# Patient Record
Sex: FEMALE | Race: WHITE | NOT HISPANIC OR LATINO | Employment: OTHER | URBAN - METROPOLITAN AREA
[De-identification: names, ages, dates, MRNs, and addresses within clinical notes are randomized per-mention and may not be internally consistent; named-entity substitution may affect disease eponyms.]

---

## 2018-12-21 DIAGNOSIS — Z01.812 PRE-OPERATIVE LABORATORY EXAMINATION: ICD-10-CM

## 2018-12-21 LAB
ERYTHROCYTE [DISTWIDTH] IN BLOOD BY AUTOMATED COUNT: 42.2 FL (ref 35.9–50)
HCT VFR BLD AUTO: 44.5 % (ref 37–47)
HGB BLD-MCNC: 14.7 G/DL (ref 12–16)
MCH RBC QN AUTO: 31 PG (ref 27–33)
MCHC RBC AUTO-ENTMCNC: 33 G/DL (ref 33.6–35)
MCV RBC AUTO: 93.9 FL (ref 81.4–97.8)
PLATELET # BLD AUTO: 272 K/UL (ref 164–446)
PMV BLD AUTO: 10.4 FL (ref 9–12.9)
RBC # BLD AUTO: 4.74 M/UL (ref 4.2–5.4)
WBC # BLD AUTO: 7.1 K/UL (ref 4.8–10.8)

## 2018-12-21 PROCEDURE — 85027 COMPLETE CBC AUTOMATED: CPT

## 2018-12-21 PROCEDURE — 87640 STAPH A DNA AMP PROBE: CPT

## 2018-12-21 PROCEDURE — 80048 BASIC METABOLIC PNL TOTAL CA: CPT

## 2018-12-21 PROCEDURE — 36415 COLL VENOUS BLD VENIPUNCTURE: CPT

## 2018-12-21 PROCEDURE — 85610 PROTHROMBIN TIME: CPT

## 2018-12-21 PROCEDURE — 85730 THROMBOPLASTIN TIME PARTIAL: CPT

## 2018-12-21 PROCEDURE — 87641 MR-STAPH DNA AMP PROBE: CPT

## 2018-12-21 RX ORDER — IBUPROFEN 200 MG
200 TABLET ORAL EVERY 6 HOURS PRN
Status: ON HOLD | COMMUNITY
End: 2019-01-03

## 2018-12-21 RX ORDER — OXYBUTYNIN CHLORIDE 5 MG/1
5 TABLET, EXTENDED RELEASE ORAL DAILY
COMMUNITY
End: 2019-05-02

## 2018-12-21 RX ORDER — SIMVASTATIN 40 MG
40 TABLET ORAL NIGHTLY
COMMUNITY
End: 2019-08-22 | Stop reason: SDUPTHER

## 2018-12-21 RX ORDER — TRIAMTERENE AND HYDROCHLOROTHIAZIDE 37.5; 25 MG/1; MG/1
1 TABLET ORAL DAILY
COMMUNITY
End: 2019-08-13 | Stop reason: SDUPTHER

## 2018-12-21 RX ORDER — TRAMADOL HYDROCHLORIDE 50 MG/1
50 TABLET ORAL EVERY 6 HOURS PRN
Status: ON HOLD | COMMUNITY
End: 2019-01-03

## 2018-12-21 RX ORDER — BENAZEPRIL HYDROCHLORIDE 10 MG/1
10 TABLET ORAL DAILY
COMMUNITY
End: 2019-08-22 | Stop reason: SDUPTHER

## 2018-12-21 NOTE — OR NURSING
"Pre-admit appointment completed. \"Preparing for your procedure\" sheet given to pt along with verbal and written instructions. Pt instructed to continue regularly prescribed medications through the day before surgery. Pt instructed to take the following medications the day of surgery with a sip of water, per anesthesia protocol; tramadol if needed.     Dr Chapman noitfied via email of procedure due to BMI=40.54 and other co-morbidities.     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    Spoke with Eloisa at Dr Acosta. She has clearance from PCP and will fax now. Pt states CXR and EKG done at PCP office. Called PCP office and they will fax reports and labs that were done.   "

## 2018-12-21 NOTE — OR NURSING
"TOTAL JOINT REPLACEMENT SURGERY   PreAdmit Appointment  Pre-Operative Education Note       1) Did you take a Total Joint Replacement Pre-Operative Education class?  Where?  Answer: no    2) If you did not take a class, did you receive pre-op education in the form of a book or through an online class?    Answer: no    3) Have you had the same joint replacement procedure within the last 3 years?   Answer: no    For patients who answered \"No\" to the above questions:     4) Was patient given information on [facility] Pre-Op Education class through the Pre-Op Education Class flyer or the Alternative Pre-op Education flyer?   Answer: yes    5) Was a [facility] Total Joint Replacement Patient Guide binder handed out?   Answer: yes    6) Did the patient refuse preoperative education?  Answer: no     DISCHARGE PLANNING NOTE - TOTAL JOINT    Procedure: Procedure(s):  KNEE ARTHROPLASTY TOTAL  Procedure Date: 1/3/2019  Insurance:    Equipment currently available at home? Walker and cane  Steps into the home? 1  Steps within the home? 0  Toilet height? standard  Type of shower? Walk in.no bench  Who will be with you during your recovery? son  Is Outpatient Physical Therapy set up after surgery? Yes  Did you take the Total Joint Class and where? No     Plan:Pt given home safety checklist and equipment resource guide.  "

## 2018-12-22 LAB
ANION GAP SERPL CALC-SCNC: 13 MMOL/L (ref 0–11.9)
APTT PPP: 35.4 SEC (ref 24.7–36)
BUN SERPL-MCNC: 20 MG/DL (ref 8–22)
CALCIUM SERPL-MCNC: 10.7 MG/DL (ref 8.5–10.5)
CHLORIDE SERPL-SCNC: 103 MMOL/L (ref 96–112)
CO2 SERPL-SCNC: 24 MMOL/L (ref 20–33)
CREAT SERPL-MCNC: 0.83 MG/DL (ref 0.5–1.4)
GLUCOSE SERPL-MCNC: 143 MG/DL (ref 65–99)
INR PPP: 1.07 (ref 0.87–1.13)
POTASSIUM SERPL-SCNC: 4.3 MMOL/L (ref 3.6–5.5)
PROTHROMBIN TIME: 14.1 SEC (ref 12–14.6)
SCCMEC + MECA PNL NOSE NAA+PROBE: NEGATIVE
SCCMEC + MECA PNL NOSE NAA+PROBE: NEGATIVE
SODIUM SERPL-SCNC: 140 MMOL/L (ref 135–145)

## 2018-12-22 NOTE — OR NURSING
Dr Chapman reviewed patients medical history. Based upon information available, Dr Chapman indicates that:     Ok to proceed from my standpoint pending the clearance paperwork.  Thank you.

## 2019-01-03 ENCOUNTER — HOSPITAL ENCOUNTER (INPATIENT)
Facility: MEDICAL CENTER | Age: 74
LOS: 1 days | DRG: 470 | End: 2019-01-04
Attending: ORTHOPAEDIC SURGERY | Admitting: ORTHOPAEDIC SURGERY
Payer: MEDICARE

## 2019-01-03 DIAGNOSIS — M17.11 ARTHRITIS OF RIGHT KNEE: ICD-10-CM

## 2019-01-03 PROCEDURE — 700111 HCHG RX REV CODE 636 W/ 250 OVERRIDE (IP): Performed by: ANESTHESIOLOGY

## 2019-01-03 PROCEDURE — 700101 HCHG RX REV CODE 250

## 2019-01-03 PROCEDURE — 160041 HCHG SURGERY MINUTES - EA ADDL 1 MIN LEVEL 4: Performed by: ORTHOPAEDIC SURGERY

## 2019-01-03 PROCEDURE — L8699 PROSTHETIC IMPLANT NOS: HCPCS | Performed by: ORTHOPAEDIC SURGERY

## 2019-01-03 PROCEDURE — 700101 HCHG RX REV CODE 250: Performed by: ORTHOPAEDIC SURGERY

## 2019-01-03 PROCEDURE — 160048 HCHG OR STATISTICAL LEVEL 1-5: Performed by: ORTHOPAEDIC SURGERY

## 2019-01-03 PROCEDURE — A9270 NON-COVERED ITEM OR SERVICE: HCPCS | Performed by: ORTHOPAEDIC SURGERY

## 2019-01-03 PROCEDURE — 700102 HCHG RX REV CODE 250 W/ 637 OVERRIDE(OP): Performed by: ANESTHESIOLOGY

## 2019-01-03 PROCEDURE — 502000 HCHG MISC OR IMPLANTS RC 0278: Performed by: ORTHOPAEDIC SURGERY

## 2019-01-03 PROCEDURE — 94760 N-INVAS EAR/PLS OXIMETRY 1: CPT

## 2019-01-03 PROCEDURE — 700111 HCHG RX REV CODE 636 W/ 250 OVERRIDE (IP): Performed by: ORTHOPAEDIC SURGERY

## 2019-01-03 PROCEDURE — 700112 HCHG RX REV CODE 229: Performed by: ORTHOPAEDIC SURGERY

## 2019-01-03 PROCEDURE — 700111 HCHG RX REV CODE 636 W/ 250 OVERRIDE (IP)

## 2019-01-03 PROCEDURE — 160035 HCHG PACU - 1ST 60 MINS PHASE I: Performed by: ORTHOPAEDIC SURGERY

## 2019-01-03 PROCEDURE — 160009 HCHG ANES TIME/MIN: Performed by: ORTHOPAEDIC SURGERY

## 2019-01-03 PROCEDURE — 700102 HCHG RX REV CODE 250 W/ 637 OVERRIDE(OP): Performed by: ORTHOPAEDIC SURGERY

## 2019-01-03 PROCEDURE — 700105 HCHG RX REV CODE 258

## 2019-01-03 PROCEDURE — A9270 NON-COVERED ITEM OR SERVICE: HCPCS | Performed by: ANESTHESIOLOGY

## 2019-01-03 PROCEDURE — 160029 HCHG SURGERY MINUTES - 1ST 30 MINS LEVEL 4: Performed by: ORTHOPAEDIC SURGERY

## 2019-01-03 PROCEDURE — 160002 HCHG RECOVERY MINUTES (STAT): Performed by: ORTHOPAEDIC SURGERY

## 2019-01-03 PROCEDURE — 700105 HCHG RX REV CODE 258: Performed by: ORTHOPAEDIC SURGERY

## 2019-01-03 PROCEDURE — 501838 HCHG SUTURE GENERAL: Performed by: ORTHOPAEDIC SURGERY

## 2019-01-03 PROCEDURE — 770001 HCHG ROOM/CARE - MED/SURG/GYN PRIV*

## 2019-01-03 PROCEDURE — 160036 HCHG PACU - EA ADDL 30 MINS PHASE I: Performed by: ORTHOPAEDIC SURGERY

## 2019-01-03 PROCEDURE — A9270 NON-COVERED ITEM OR SERVICE: HCPCS

## 2019-01-03 PROCEDURE — 502579 HCHG PACK, TOTAL KNEE: Performed by: ORTHOPAEDIC SURGERY

## 2019-01-03 PROCEDURE — 500002 HCHG ADHESIVE, DERMABOND: Performed by: ORTHOPAEDIC SURGERY

## 2019-01-03 PROCEDURE — 700102 HCHG RX REV CODE 250 W/ 637 OVERRIDE(OP)

## 2019-01-03 PROCEDURE — 160022 HCHG BLOCK: Performed by: ORTHOPAEDIC SURGERY

## 2019-01-03 PROCEDURE — 0SRC0J9 REPLACEMENT OF RIGHT KNEE JOINT WITH SYNTHETIC SUBSTITUTE, CEMENTED, OPEN APPROACH: ICD-10-PCS | Performed by: ORTHOPAEDIC SURGERY

## 2019-01-03 DEVICE — IMPLANT GII OVAL RESURFACING PAT 32MM (1EA): Type: IMPLANTABLE DEVICE | Site: KNEE | Status: FUNCTIONAL

## 2019-01-03 DEVICE — CEMENT ORTHOPEDIC HV US  (10/PK): Type: IMPLANTABLE DEVICE | Site: KNEE | Status: FUNCTIONAL

## 2019-01-03 DEVICE — IMPLANT LEGION PS NP FEM SZ 5 RT: Type: IMPLANTABLE DEVICE | Site: KNEE | Status: FUNCTIONAL

## 2019-01-03 DEVICE — IMPLANTABLE DEVICE: Type: IMPLANTABLE DEVICE | Site: KNEE | Status: FUNCTIONAL

## 2019-01-03 DEVICE — IMPLANT GNS II CMT TIB SIZE 4 RIGHT: Type: IMPLANTABLE DEVICE | Site: KNEE | Status: FUNCTIONAL

## 2019-01-03 RX ORDER — MEPERIDINE HYDROCHLORIDE 25 MG/ML
12.5 INJECTION INTRAMUSCULAR; INTRAVENOUS; SUBCUTANEOUS
Status: DISCONTINUED | OUTPATIENT
Start: 2019-01-03 | End: 2019-01-03 | Stop reason: HOSPADM

## 2019-01-03 RX ORDER — KETOROLAC TROMETHAMINE 30 MG/ML
15 INJECTION, SOLUTION INTRAMUSCULAR; INTRAVENOUS EVERY 6 HOURS
Status: DISCONTINUED | OUTPATIENT
Start: 2019-01-03 | End: 2019-01-04 | Stop reason: HOSPADM

## 2019-01-03 RX ORDER — MELOXICAM 7.5 MG/1
7.5 TABLET ORAL DAILY
Qty: 30 TAB | Refills: 1 | Status: SHIPPED | OUTPATIENT
Start: 2019-01-03 | End: 2019-02-02

## 2019-01-03 RX ORDER — METOPROLOL TARTRATE 1 MG/ML
1 INJECTION, SOLUTION INTRAVENOUS
Status: DISCONTINUED | OUTPATIENT
Start: 2019-01-03 | End: 2019-01-03 | Stop reason: HOSPADM

## 2019-01-03 RX ORDER — TRIAMTERENE AND HYDROCHLOROTHIAZIDE 37.5; 25 MG/1; MG/1
1 TABLET ORAL DAILY
Status: DISCONTINUED | OUTPATIENT
Start: 2019-01-04 | End: 2019-01-04 | Stop reason: HOSPADM

## 2019-01-03 RX ORDER — TRAMADOL HYDROCHLORIDE 50 MG/1
50-100 TABLET ORAL EVERY 6 HOURS PRN
Qty: 80 TAB | Refills: 0 | Status: SHIPPED | OUTPATIENT
Start: 2019-01-03 | End: 2019-01-13

## 2019-01-03 RX ORDER — DEXAMETHASONE SODIUM PHOSPHATE 4 MG/ML
4 INJECTION, SOLUTION INTRA-ARTICULAR; INTRALESIONAL; INTRAMUSCULAR; INTRAVENOUS; SOFT TISSUE
Status: DISCONTINUED | OUTPATIENT
Start: 2019-01-03 | End: 2019-01-04 | Stop reason: HOSPADM

## 2019-01-03 RX ORDER — HYDROMORPHONE HYDROCHLORIDE 2 MG/ML
0.2 INJECTION, SOLUTION INTRAMUSCULAR; INTRAVENOUS; SUBCUTANEOUS
Status: DISCONTINUED | OUTPATIENT
Start: 2019-01-03 | End: 2019-01-03 | Stop reason: HOSPADM

## 2019-01-03 RX ORDER — TRANEXAMIC ACID 100 MG/ML
INJECTION, SOLUTION INTRAVENOUS
Status: COMPLETED
Start: 2019-01-03 | End: 2019-01-03

## 2019-01-03 RX ORDER — OXYCODONE HCL 5 MG/5 ML
10 SOLUTION, ORAL ORAL
Status: COMPLETED | OUTPATIENT
Start: 2019-01-03 | End: 2019-01-03

## 2019-01-03 RX ORDER — GABAPENTIN 300 MG/1
CAPSULE ORAL
Status: COMPLETED
Start: 2019-01-03 | End: 2019-01-03

## 2019-01-03 RX ORDER — HYDRALAZINE HYDROCHLORIDE 20 MG/ML
5 INJECTION INTRAMUSCULAR; INTRAVENOUS
Status: DISCONTINUED | OUTPATIENT
Start: 2019-01-03 | End: 2019-01-03 | Stop reason: HOSPADM

## 2019-01-03 RX ORDER — OXYCODONE HYDROCHLORIDE 10 MG/1
10 TABLET ORAL
Status: DISCONTINUED | OUTPATIENT
Start: 2019-01-03 | End: 2019-01-04 | Stop reason: HOSPADM

## 2019-01-03 RX ORDER — EPINEPHRINE 1 MG/ML(1)
AMPUL (ML) INJECTION
Status: DISCONTINUED | OUTPATIENT
Start: 2019-01-03 | End: 2019-01-03 | Stop reason: HOSPADM

## 2019-01-03 RX ORDER — LIDOCAINE HYDROCHLORIDE 10 MG/ML
INJECTION, SOLUTION INFILTRATION; PERINEURAL
Status: COMPLETED
Start: 2019-01-03 | End: 2019-01-03

## 2019-01-03 RX ORDER — ACETAMINOPHEN 500 MG
TABLET ORAL
Status: COMPLETED
Start: 2019-01-03 | End: 2019-01-03

## 2019-01-03 RX ORDER — DIPHENHYDRAMINE HYDROCHLORIDE 50 MG/ML
25 INJECTION INTRAMUSCULAR; INTRAVENOUS EVERY 6 HOURS PRN
Status: DISCONTINUED | OUTPATIENT
Start: 2019-01-03 | End: 2019-01-04 | Stop reason: HOSPADM

## 2019-01-03 RX ORDER — SODIUM CHLORIDE, SODIUM LACTATE, POTASSIUM CHLORIDE, CALCIUM CHLORIDE 600; 310; 30; 20 MG/100ML; MG/100ML; MG/100ML; MG/100ML
1000 INJECTION, SOLUTION INTRAVENOUS CONTINUOUS
Status: DISCONTINUED | OUTPATIENT
Start: 2019-01-03 | End: 2019-01-03 | Stop reason: HOSPADM

## 2019-01-03 RX ORDER — SIMVASTATIN 20 MG
40 TABLET ORAL NIGHTLY
Status: DISCONTINUED | OUTPATIENT
Start: 2019-01-03 | End: 2019-01-04 | Stop reason: HOSPADM

## 2019-01-03 RX ORDER — SODIUM CHLORIDE, SODIUM LACTATE, POTASSIUM CHLORIDE, CALCIUM CHLORIDE 600; 310; 30; 20 MG/100ML; MG/100ML; MG/100ML; MG/100ML
INJECTION, SOLUTION INTRAVENOUS ONCE
Status: COMPLETED | OUTPATIENT
Start: 2019-01-03 | End: 2019-01-03

## 2019-01-03 RX ORDER — OXYCODONE HCL 5 MG/5 ML
5 SOLUTION, ORAL ORAL
Status: COMPLETED | OUTPATIENT
Start: 2019-01-03 | End: 2019-01-03

## 2019-01-03 RX ORDER — POLYETHYLENE GLYCOL 3350 17 G/17G
1 POWDER, FOR SOLUTION ORAL 2 TIMES DAILY PRN
Status: DISCONTINUED | OUTPATIENT
Start: 2019-01-03 | End: 2019-01-04 | Stop reason: HOSPADM

## 2019-01-03 RX ORDER — CHLORPROMAZINE HYDROCHLORIDE 25 MG/1
25 TABLET, FILM COATED ORAL EVERY 6 HOURS PRN
Status: DISCONTINUED | OUTPATIENT
Start: 2019-01-03 | End: 2019-01-04 | Stop reason: HOSPADM

## 2019-01-03 RX ORDER — ACETAMINOPHEN 500 MG
1000 TABLET ORAL EVERY 6 HOURS PRN
COMMUNITY
End: 2022-09-15

## 2019-01-03 RX ORDER — ACETAMINOPHEN 500 MG
1000 TABLET ORAL EVERY 6 HOURS
Status: DISCONTINUED | OUTPATIENT
Start: 2019-01-03 | End: 2019-01-04 | Stop reason: HOSPADM

## 2019-01-03 RX ORDER — CHLORPROMAZINE HYDROCHLORIDE 25 MG/ML
25 INJECTION INTRAMUSCULAR EVERY 6 HOURS PRN
Status: DISCONTINUED | OUTPATIENT
Start: 2019-01-03 | End: 2019-01-04 | Stop reason: HOSPADM

## 2019-01-03 RX ORDER — DEXAMETHASONE SODIUM PHOSPHATE 4 MG/ML
10 INJECTION, SOLUTION INTRA-ARTICULAR; INTRALESIONAL; INTRAMUSCULAR; INTRAVENOUS; SOFT TISSUE ONCE
Status: COMPLETED | OUTPATIENT
Start: 2019-01-04 | End: 2019-01-04

## 2019-01-03 RX ORDER — BISACODYL 10 MG
10 SUPPOSITORY, RECTAL RECTAL
Status: DISCONTINUED | OUTPATIENT
Start: 2019-01-03 | End: 2019-01-04 | Stop reason: HOSPADM

## 2019-01-03 RX ORDER — HALOPERIDOL 5 MG/ML
1 INJECTION INTRAMUSCULAR EVERY 6 HOURS PRN
Status: DISCONTINUED | OUTPATIENT
Start: 2019-01-03 | End: 2019-01-04 | Stop reason: HOSPADM

## 2019-01-03 RX ORDER — HYDROMORPHONE HYDROCHLORIDE 2 MG/ML
0.1 INJECTION, SOLUTION INTRAMUSCULAR; INTRAVENOUS; SUBCUTANEOUS
Status: DISCONTINUED | OUTPATIENT
Start: 2019-01-03 | End: 2019-01-03 | Stop reason: HOSPADM

## 2019-01-03 RX ORDER — OXYCODONE HYDROCHLORIDE 5 MG/1
5 TABLET ORAL
Status: DISCONTINUED | OUTPATIENT
Start: 2019-01-03 | End: 2019-01-04 | Stop reason: HOSPADM

## 2019-01-03 RX ORDER — CELECOXIB 200 MG/1
CAPSULE ORAL
Status: COMPLETED
Start: 2019-01-03 | End: 2019-01-03

## 2019-01-03 RX ORDER — AMOXICILLIN 250 MG
1 CAPSULE ORAL
Status: DISCONTINUED | OUTPATIENT
Start: 2019-01-03 | End: 2019-01-04 | Stop reason: HOSPADM

## 2019-01-03 RX ORDER — TRAMADOL HYDROCHLORIDE 50 MG/1
50 TABLET ORAL EVERY 4 HOURS PRN
Status: DISCONTINUED | OUTPATIENT
Start: 2019-01-03 | End: 2019-01-04 | Stop reason: HOSPADM

## 2019-01-03 RX ORDER — HALOPERIDOL 5 MG/ML
1 INJECTION INTRAMUSCULAR
Status: DISCONTINUED | OUTPATIENT
Start: 2019-01-03 | End: 2019-01-03 | Stop reason: HOSPADM

## 2019-01-03 RX ORDER — VANCOMYCIN HCL 900 MCG/MG
POWDER (GRAM) MISCELLANEOUS
Status: DISCONTINUED | OUTPATIENT
Start: 2019-01-03 | End: 2019-01-03 | Stop reason: HOSPADM

## 2019-01-03 RX ORDER — ONDANSETRON 2 MG/ML
4 INJECTION INTRAMUSCULAR; INTRAVENOUS EVERY 4 HOURS PRN
Status: DISCONTINUED | OUTPATIENT
Start: 2019-01-03 | End: 2019-01-04 | Stop reason: HOSPADM

## 2019-01-03 RX ORDER — DOCUSATE SODIUM 100 MG/1
100 CAPSULE, LIQUID FILLED ORAL 2 TIMES DAILY
Status: DISCONTINUED | OUTPATIENT
Start: 2019-01-03 | End: 2019-01-04 | Stop reason: HOSPADM

## 2019-01-03 RX ORDER — SCOLOPAMINE TRANSDERMAL SYSTEM 1 MG/1
1 PATCH, EXTENDED RELEASE TRANSDERMAL
Status: DISCONTINUED | OUTPATIENT
Start: 2019-01-03 | End: 2019-01-04 | Stop reason: HOSPADM

## 2019-01-03 RX ORDER — DIPHENHYDRAMINE HCL 25 MG
25 TABLET ORAL EVERY 6 HOURS PRN
Status: DISCONTINUED | OUTPATIENT
Start: 2019-01-03 | End: 2019-01-04 | Stop reason: HOSPADM

## 2019-01-03 RX ORDER — HYDROMORPHONE HYDROCHLORIDE 2 MG/ML
0.4 INJECTION, SOLUTION INTRAMUSCULAR; INTRAVENOUS; SUBCUTANEOUS
Status: DISCONTINUED | OUTPATIENT
Start: 2019-01-03 | End: 2019-01-03 | Stop reason: HOSPADM

## 2019-01-03 RX ORDER — BENAZEPRIL HYDROCHLORIDE 10 MG/1
10 TABLET ORAL DAILY
Status: DISCONTINUED | OUTPATIENT
Start: 2019-01-04 | End: 2019-01-04 | Stop reason: HOSPADM

## 2019-01-03 RX ORDER — DIPHENHYDRAMINE HYDROCHLORIDE 50 MG/ML
12.5 INJECTION INTRAMUSCULAR; INTRAVENOUS
Status: DISCONTINUED | OUTPATIENT
Start: 2019-01-03 | End: 2019-01-03 | Stop reason: HOSPADM

## 2019-01-03 RX ORDER — AMOXICILLIN 250 MG
1 CAPSULE ORAL NIGHTLY
Status: DISCONTINUED | OUTPATIENT
Start: 2019-01-03 | End: 2019-01-04 | Stop reason: HOSPADM

## 2019-01-03 RX ORDER — ONDANSETRON 2 MG/ML
4 INJECTION INTRAMUSCULAR; INTRAVENOUS
Status: DISCONTINUED | OUTPATIENT
Start: 2019-01-03 | End: 2019-01-03 | Stop reason: HOSPADM

## 2019-01-03 RX ORDER — SODIUM CHLORIDE, SODIUM LACTATE, POTASSIUM CHLORIDE, CALCIUM CHLORIDE 600; 310; 30; 20 MG/100ML; MG/100ML; MG/100ML; MG/100ML
INJECTION, SOLUTION INTRAVENOUS CONTINUOUS
Status: DISPENSED | OUTPATIENT
Start: 2019-01-03 | End: 2019-01-03

## 2019-01-03 RX ORDER — DIPHENHYDRAMINE HCL 25 MG
25 TABLET ORAL NIGHTLY PRN
Status: DISCONTINUED | OUTPATIENT
Start: 2019-01-04 | End: 2019-01-04 | Stop reason: HOSPADM

## 2019-01-03 RX ORDER — ENEMA 19; 7 G/133ML; G/133ML
1 ENEMA RECTAL
Status: DISCONTINUED | OUTPATIENT
Start: 2019-01-03 | End: 2019-01-04 | Stop reason: HOSPADM

## 2019-01-03 RX ORDER — HYDROMORPHONE HYDROCHLORIDE 1 MG/ML
0.5 INJECTION, SOLUTION INTRAMUSCULAR; INTRAVENOUS; SUBCUTANEOUS
Status: DISCONTINUED | OUTPATIENT
Start: 2019-01-03 | End: 2019-01-04 | Stop reason: HOSPADM

## 2019-01-03 RX ORDER — OXYBUTYNIN CHLORIDE 5 MG/1
5 TABLET, EXTENDED RELEASE ORAL DAILY
Status: DISCONTINUED | OUTPATIENT
Start: 2019-01-04 | End: 2019-01-04 | Stop reason: HOSPADM

## 2019-01-03 RX ORDER — LORAZEPAM 2 MG/ML
0.5 INJECTION INTRAMUSCULAR
Status: DISCONTINUED | OUTPATIENT
Start: 2019-01-03 | End: 2019-01-03 | Stop reason: HOSPADM

## 2019-01-03 RX ORDER — OXYCODONE HYDROCHLORIDE 5 MG/1
5-10 TABLET ORAL EVERY 4 HOURS PRN
Qty: 60 TAB | Refills: 0 | Status: SHIPPED | OUTPATIENT
Start: 2019-01-03 | End: 2019-01-08

## 2019-01-03 RX ADMIN — OXYCODONE HYDROCHLORIDE 5 MG: 5 TABLET ORAL at 18:44

## 2019-01-03 RX ADMIN — GABAPENTIN 300 MG: 300 CAPSULE ORAL at 13:42

## 2019-01-03 RX ADMIN — FENTANYL CITRATE 25 MCG: 50 INJECTION, SOLUTION INTRAMUSCULAR; INTRAVENOUS at 17:45

## 2019-01-03 RX ADMIN — ACETAMINOPHEN 1000 MG: 500 TABLET, FILM COATED ORAL at 18:44

## 2019-01-03 RX ADMIN — FENTANYL CITRATE 50 MCG: 50 INJECTION, SOLUTION INTRAMUSCULAR; INTRAVENOUS at 17:09

## 2019-01-03 RX ADMIN — CELECOXIB 200 MG: 200 CAPSULE ORAL at 13:42

## 2019-01-03 RX ADMIN — OXYCODONE HYDROCHLORIDE 5 MG: 5 TABLET ORAL at 21:49

## 2019-01-03 RX ADMIN — FENTANYL CITRATE 25 MCG: 50 INJECTION, SOLUTION INTRAMUSCULAR; INTRAVENOUS at 17:30

## 2019-01-03 RX ADMIN — SODIUM CHLORIDE, SODIUM LACTATE, POTASSIUM CHLORIDE, CALCIUM CHLORIDE 1000 ML: 600; 310; 30; 20 INJECTION, SOLUTION INTRAVENOUS at 13:42

## 2019-01-03 RX ADMIN — FENTANYL CITRATE 25 MCG: 50 INJECTION, SOLUTION INTRAMUSCULAR; INTRAVENOUS at 17:40

## 2019-01-03 RX ADMIN — LIDOCAINE HYDROCHLORIDE 0.5 ML: 10 INJECTION, SOLUTION INFILTRATION; PERINEURAL at 13:40

## 2019-01-03 RX ADMIN — OXYCODONE HYDROCHLORIDE 5 MG: 5 SOLUTION ORAL at 17:30

## 2019-01-03 RX ADMIN — SODIUM CHLORIDE, POTASSIUM CHLORIDE, SODIUM LACTATE AND CALCIUM CHLORIDE: 600; 310; 30; 20 INJECTION, SOLUTION INTRAVENOUS at 18:44

## 2019-01-03 RX ADMIN — TRANEXAMIC ACID 2000 MG: 100 INJECTION, SOLUTION INTRAVENOUS at 17:10

## 2019-01-03 RX ADMIN — SODIUM CHLORIDE 2 G: 9 INJECTION, SOLUTION INTRAVENOUS at 21:44

## 2019-01-03 RX ADMIN — FENTANYL CITRATE 50 MCG: 50 INJECTION, SOLUTION INTRAMUSCULAR; INTRAVENOUS at 17:16

## 2019-01-03 RX ADMIN — DOCUSATE SODIUM 100 MG: 100 CAPSULE, LIQUID FILLED ORAL at 18:44

## 2019-01-03 RX ADMIN — SODIUM CHLORIDE, SODIUM LACTATE, POTASSIUM CHLORIDE, CALCIUM CHLORIDE 1000 ML: 600; 310; 30; 20 INJECTION, SOLUTION INTRAVENOUS at 17:27

## 2019-01-03 RX ADMIN — KETOROLAC TROMETHAMINE 15 MG: 30 INJECTION, SOLUTION INTRAMUSCULAR at 18:43

## 2019-01-03 RX ADMIN — Medication 0.5 ML: at 13:40

## 2019-01-03 RX ADMIN — SIMVASTATIN 40 MG: 20 TABLET, FILM COATED ORAL at 21:49

## 2019-01-03 RX ADMIN — DOCUSATE SODIUM AND SENNOSIDES 1 TABLET: 8.6; 5 TABLET, FILM COATED ORAL at 21:49

## 2019-01-03 RX ADMIN — ACETAMINOPHEN 1000 MG: 500 TABLET, COATED ORAL at 13:41

## 2019-01-03 ASSESSMENT — PAIN SCALES - GENERAL
PAINLEVEL_OUTOF10: ASSUMED PAIN PRESENT
PAINLEVEL_OUTOF10: 7
PAINLEVEL_OUTOF10: 1
PAINLEVEL_OUTOF10: 6
PAINLEVEL_OUTOF10: 8
PAINLEVEL_OUTOF10: 7

## 2019-01-03 ASSESSMENT — COGNITIVE AND FUNCTIONAL STATUS - GENERAL
DAILY ACTIVITIY SCORE: 20
TOILETING: A LITTLE
CLIMB 3 TO 5 STEPS WITH RAILING: A LITTLE
WALKING IN HOSPITAL ROOM: A LITTLE
STANDING UP FROM CHAIR USING ARMS: A LITTLE
DRESSING REGULAR UPPER BODY CLOTHING: A LITTLE
MOVING TO AND FROM BED TO CHAIR: A LITTLE
MOBILITY SCORE: 18
TURNING FROM BACK TO SIDE WHILE IN FLAT BAD: A LITTLE
HELP NEEDED FOR BATHING: A LITTLE
MOVING FROM LYING ON BACK TO SITTING ON SIDE OF FLAT BED: A LITTLE
SUGGESTED CMS G CODE MODIFIER DAILY ACTIVITY: CJ
SUGGESTED CMS G CODE MODIFIER MOBILITY: CK
DRESSING REGULAR LOWER BODY CLOTHING: A LITTLE

## 2019-01-03 ASSESSMENT — PATIENT HEALTH QUESTIONNAIRE - PHQ9
1. LITTLE INTEREST OR PLEASURE IN DOING THINGS: NOT AT ALL
SUM OF ALL RESPONSES TO PHQ9 QUESTIONS 1 AND 2: 0
2. FEELING DOWN, DEPRESSED, IRRITABLE, OR HOPELESS: NOT AT ALL

## 2019-01-03 ASSESSMENT — LIFESTYLE VARIABLES: ALCOHOL_USE: NO

## 2019-01-03 NOTE — FACE TO FACE
Face to Face Note  -  Durable Medical Equipment    Mil Acosta M.D. - NPI: [id number]  I certify that this patient is under my care and that they had a durable medical equipment(DME)face to face encounter by myself that meets the physician DME face-to-face encounter requirements with this patient on:    Date of encounter:   Patient:                    MRN:                       YOB: 2021  Brianne Wilson  [id number]  1945     The encounter with the patient was in whole, or in part, for the following medical condition, which is the primary reason for durable medical equipment:  Total Joint Replacement    I certify that, based on my findings, the following durable medical equipment is medically necessary:  Walkers.    HOME O2 Saturation Measurements:(Values must be present for Home Oxygen orders)         ,     ,         My Clinical findings support the need for the above equipment due to:  Abnormal Gait    Supporting Symptoms: Limp, pain, weakness

## 2019-01-04 VITALS
TEMPERATURE: 98 F | HEIGHT: 63 IN | RESPIRATION RATE: 18 BRPM | WEIGHT: 229.94 LBS | DIASTOLIC BLOOD PRESSURE: 55 MMHG | SYSTOLIC BLOOD PRESSURE: 114 MMHG | OXYGEN SATURATION: 96 % | BODY MASS INDEX: 40.74 KG/M2 | HEART RATE: 67 BPM

## 2019-01-04 LAB
ANION GAP SERPL CALC-SCNC: 11 MMOL/L (ref 0–11.9)
BUN SERPL-MCNC: 15 MG/DL (ref 8–22)
CALCIUM SERPL-MCNC: 9.7 MG/DL (ref 8.4–10.2)
CHLORIDE SERPL-SCNC: 102 MMOL/L (ref 96–112)
CO2 SERPL-SCNC: 23 MMOL/L (ref 20–33)
CREAT SERPL-MCNC: 0.9 MG/DL (ref 0.5–1.4)
GLUCOSE SERPL-MCNC: 214 MG/DL (ref 65–99)
HCT VFR BLD AUTO: 39.7 % (ref 37–47)
HGB BLD-MCNC: 13.4 G/DL (ref 12–16)
POTASSIUM SERPL-SCNC: 4.2 MMOL/L (ref 3.6–5.5)
SODIUM SERPL-SCNC: 136 MMOL/L (ref 135–145)

## 2019-01-04 PROCEDURE — 36415 COLL VENOUS BLD VENIPUNCTURE: CPT

## 2019-01-04 PROCEDURE — A9270 NON-COVERED ITEM OR SERVICE: HCPCS | Performed by: ORTHOPAEDIC SURGERY

## 2019-01-04 PROCEDURE — 85014 HEMATOCRIT: CPT

## 2019-01-04 PROCEDURE — 700105 HCHG RX REV CODE 258: Performed by: ORTHOPAEDIC SURGERY

## 2019-01-04 PROCEDURE — 97165 OT EVAL LOW COMPLEX 30 MIN: CPT

## 2019-01-04 PROCEDURE — 97162 PT EVAL MOD COMPLEX 30 MIN: CPT

## 2019-01-04 PROCEDURE — 700112 HCHG RX REV CODE 229: Performed by: ORTHOPAEDIC SURGERY

## 2019-01-04 PROCEDURE — 85018 HEMOGLOBIN: CPT

## 2019-01-04 PROCEDURE — 700111 HCHG RX REV CODE 636 W/ 250 OVERRIDE (IP): Performed by: ORTHOPAEDIC SURGERY

## 2019-01-04 PROCEDURE — 80048 BASIC METABOLIC PNL TOTAL CA: CPT

## 2019-01-04 PROCEDURE — 700102 HCHG RX REV CODE 250 W/ 637 OVERRIDE(OP): Performed by: ORTHOPAEDIC SURGERY

## 2019-01-04 RX ADMIN — ACETAMINOPHEN 1000 MG: 500 TABLET, FILM COATED ORAL at 05:08

## 2019-01-04 RX ADMIN — OXYCODONE HYDROCHLORIDE 5 MG: 5 TABLET ORAL at 13:11

## 2019-01-04 RX ADMIN — TRIAMTERENE AND HYDROCHLOROTHIAZIDE 1 TABLET: 37.5; 25 TABLET ORAL at 05:09

## 2019-01-04 RX ADMIN — OXYCODONE HYDROCHLORIDE 5 MG: 5 TABLET ORAL at 00:39

## 2019-01-04 RX ADMIN — KETOROLAC TROMETHAMINE 15 MG: 30 INJECTION, SOLUTION INTRAMUSCULAR at 05:09

## 2019-01-04 RX ADMIN — ACETAMINOPHEN 1000 MG: 500 TABLET, FILM COATED ORAL at 00:38

## 2019-01-04 RX ADMIN — OXYCODONE HYDROCHLORIDE 5 MG: 5 TABLET ORAL at 05:08

## 2019-01-04 RX ADMIN — OXYCODONE HYDROCHLORIDE 5 MG: 5 TABLET ORAL at 10:15

## 2019-01-04 RX ADMIN — KETOROLAC TROMETHAMINE 15 MG: 30 INJECTION, SOLUTION INTRAMUSCULAR at 00:39

## 2019-01-04 RX ADMIN — DEXAMETHASONE SODIUM PHOSPHATE 10 MG: 4 INJECTION, SOLUTION INTRAMUSCULAR; INTRAVENOUS at 05:10

## 2019-01-04 RX ADMIN — ASPIRIN 81 MG: 81 TABLET, COATED ORAL at 05:08

## 2019-01-04 RX ADMIN — BENAZEPRIL HYDROCHLORIDE 10 MG: 10 TABLET, FILM COATED ORAL at 05:08

## 2019-01-04 RX ADMIN — SODIUM CHLORIDE 2 G: 9 INJECTION, SOLUTION INTRAVENOUS at 05:18

## 2019-01-04 RX ADMIN — DOCUSATE SODIUM 100 MG: 100 CAPSULE, LIQUID FILLED ORAL at 05:08

## 2019-01-04 RX ADMIN — OXYBUTYNIN CHLORIDE 5 MG: 5 TABLET, EXTENDED RELEASE ORAL at 05:09

## 2019-01-04 ASSESSMENT — COGNITIVE AND FUNCTIONAL STATUS - GENERAL
DAILY ACTIVITIY SCORE: 21
DRESSING REGULAR LOWER BODY CLOTHING: A LITTLE
TOILETING: A LITTLE
SUGGESTED CMS G CODE MODIFIER DAILY ACTIVITY: CJ
HELP NEEDED FOR BATHING: A LITTLE

## 2019-01-04 ASSESSMENT — GAIT ASSESSMENTS
GAIT LEVEL OF ASSIST: STAND BY ASSIST
ASSISTIVE DEVICE: FRONT WHEEL WALKER
DISTANCE (FEET): 150
DEVIATION: STEP TO;ANTALGIC

## 2019-01-04 ASSESSMENT — PAIN SCALES - GENERAL
PAINLEVEL_OUTOF10: 0
PAINLEVEL_OUTOF10: 5
PAINLEVEL_OUTOF10: 5
PAINLEVEL_OUTOF10: 3

## 2019-01-04 ASSESSMENT — ACTIVITIES OF DAILY LIVING (ADL): TOILETING: INDEPENDENT

## 2019-01-04 NOTE — CARE PLAN
Problem: Knowledge Deficit  Goal: Knowledge of the prescribed therapeutic regimen will improve  POC discussed. Pt understands she must work with therapy before DC. All questions and concerns addressed.

## 2019-01-04 NOTE — DIETARY
"Nutrition services: BMI -Day 1 of admit.  Brianne Wilson is a 73 y.o. female with admitting DX of Osteoarthritis of R knee.      Pt with BMI >40 (=40.73). Pt with PMH including R knee pain, HTN, High Cholesterol, and Cataract. Pt underwent R total knee arthroplasty on 1/3. Pt with negative nursing nutrition screening. Current diet order of Regular. Weight loss counseling not appropriate in acute care setting. RD available for further consult as needed.     Assessment:  Height: 160 cm (5' 3\")  Weight: 104.3 kg (229 lb 15 oz)  Body mass index is 40.73 kg/m².  Diet/Intake: Regular     Labs (glucose 214) , MAR and Chart reviewed.     Recommendations/Plan:Referral to outpatient nutrition services for weight management after D/C.   1. Diet as ordered.   2. Encourage intake of 50% or greater.   3. Document intake of all meals  as % taken in ADL's to provide interdisciplinary communication across all shifts.   4. Monitor weight.  5. Nutrition rep will continue to see patient for ongoing meal and snack preferences.             "

## 2019-01-04 NOTE — PROGRESS NOTES
Pt is AAO x4.  Pt reports a 5/10 R knee pain level.  Understands when next pain med is due.  R knee dressing in place, CDI.  VS WNL.  R PIV patent, saline locked.  Pt up in chair for bfast.   POC discussed.  All needs met at this time.  Bed in low position.  Call light within reach.  Rounding in place.

## 2019-01-04 NOTE — THERAPY
"Physical Therapy Evaluation completed.   Bed Mobility:     Transfers: Sit to Stand: Stand by Assist  Gait: Level Of Assist: Stand by Assist with Front-Wheel Walker x 150 feet      Plan of Care: Patient with no further skilled PT needs in the acute care setting at this time  Discharge Recommendations: Equipment: No Equipment Needed. Post-acute therapy Discharge to home with outpatient or home health for additional skilled therapy services.  73 year old female S/P R TKR.Pt lives at home with son and is usually quite active.Pt was safe with transfers,ambulation and stairs.She understands HEP and has no equipment needs.Pt is safe and ready for home  See \"Rehab Therapy-Acute\" Patient Summary Report for complete documentation.     "

## 2019-01-04 NOTE — OR NURSING
1653- Patient admitted to PACU from the operating room following right total knee arthroplasty. Patient respirations noted as even and unlabored with oxygen infusing via nasal cannula at 4 liters per minute with a SPO2 reading of 99% currently. No distress noted by patient at this time.     1705- Patient dressing to right knee noted as clean, dry and intact. Ice placed to knee with pillow also placed underneath patient ankle. 2+ pulses noted to patient dorsalis pedis pulse and patient posterior tibialis pulse. Patient able to dorsiflex and plantar flex with right lower extremity with discomfort noted with movement.     1709- Patient medicated for complaints of pain with Fentanyl as charted in medication administration record.     1716- Fentanyl given as charted in medication administration record for complaints of right knee pain.    1730- Patient states that pain has been reduced. Oral and intravenously given pain medications administered at this time for patient comfort.     1740- Patient resting in bed. No distress noted at this time. 25 micrograms of Fentanyl given. Patient states she is becoming more comfortable at this time.    1750- Patient asleep in bed.    1758- Report called to General surgical unit. Patient to be transferred shortly.

## 2019-01-04 NOTE — THERAPY
"Occupational Therapy Evaluation completed.   Functional Status:  Pt is a 72 y/o female, admit for a R TKA. Pt lives with her son, who can assist with care after d/c. Pt PLOF- I with AMB ADLS with the use of a cane. Pt presents with c/o moderate pain with ADLS and functional mobility, is at the SBA to Supervised level. Pt was educated regarding techniques for ADLS and functional transfers, post surgery. Pt will be seen for initial evaluation and treatment only, as she is functional in this setting.  Plan of Care: Patient with no further skilled OT needs in the acute care setting at this time  Discharge Recommendations:  Equipment: No Equipment Needed. Post-acute therapy Discharge to home with outpatient or home health for additional skilled therapy services.    See \"Rehab Therapy-Acute\" Patient Summary Report for complete documentation.    "

## 2019-01-04 NOTE — PROGRESS NOTES
" Subjective:      No events.  Pain controlled.  Ambulatory overnight.    Objective:  Blood pressure 119/60, pulse (!) 58, temperature 36.5 °C (97.7 °F), temperature source Oral, resp. rate 18, height 1.6 m (5' 3\"), weight 104.3 kg (229 lb 15 oz), SpO2 99 %, not currently breastfeeding.    Recent Labs      01/04/19   0511   HEMOGLOBIN  13.4   HEMATOCRIT  39.7     Recent Labs      01/04/19   0511   SODIUM  136   POTASSIUM  4.2   CHLORIDE  102   CO2  23   GLUCOSE  214*   BUN  15   CREATININE  0.90   CALCIUM  9.7         Intake/Output Summary (Last 24 hours) at 01/04/19 0628  Last data filed at 01/04/19 0500   Gross per 24 hour   Intake              360 ml   Output              500 ml   Net             -140 ml       Comfortable, no distress  Neurologically and vascularly intact with palpable pedal pulses bilaterally.  Dressing C/D/I      Assessment:    Doing well s/p total knee arthroplasty.    Plan:      Diet as tolerated  Mobilize today - WBAT  OT/PT  DVT ppx - ASA BID + Sequential Compression Devices  Plan to discharge home  Follow-up with Dr. Acosta' office approximately 2 weeks post-op.    "

## 2019-01-04 NOTE — OP REPORT
Preop Diagnosis: Advanced right knee arthritis  Postop Diagnosis:  Same   Procedure: Right total knee arthroplasty  Surgeon: Dr. Mil Acosta MD  Assistant: Jessica Grigsby PA-C  Anesthesia: Dr. Chapman.  General +Adductor canal block  Estimated Blood Loss: 50cc  Drains: None  Complications: None  Tourniquet time: 45 minutes    Implants: [employer] PS, size 5 standard femur, size 4 tibia, with a 13 mm insert and 32 oval patella.     Indications: She is a pleasant 73-year-old female has had severe progressive knee pain with associated valgus deformity and limp.  She failed conservative management.  Examination and radiographic findings were consistent with severe osteoarthritis.  We discussed the treatment options and she was indicated for total knee arthroplasty.  We discussed the risk of bleeding, transfusion, pain, neurovascular injury, stiffness, fracture, infection, wound complication, loosening of the parts over time, blood clots, and medical complication.  She elected to proceed.    Pertinent Findings and Releases: There were severe full-thickness changes in all compartments as well as a valgus deformity.  Releases included the deep medial MCL, posterolateral capsule, and cruciate ligaments which were degenerative.      She was identified in the preoperative holding area and informed consent and site marking were confirmed.  An adductor canal block had been performed by Dr. Chapman.  She was then brought back to the OR where anesthesia was performed.  She was positioned supine with all bony prominences well padded with a padded tourniquet on the thigh.  The extremity was prepped and draped in the usual sterile fashion. I performed a pre-procedure timeout to confirm we had the correct patient, side, site, procedure, and presence of necessary personal and equipment.  I confirmed that  Ancef and TXA had been given.  The tourniquet was then inflated.    A midline incision was made medial to the  tibial tubercle centered over patella taken down to the deep capsule of the knee. A medial parapatellar arthrotomy was performed.  The fat pad was released. The patella was everted and the knee was flexed. The remnants of the anterior horn of the medial and lateral meniscus were removed as well as the ACL and PCL from the intercondylar notch. All distal protruding osteophytes were removed. I then drilled and accessed the intramedullary canal of the femur, lavaged it and placed the intramedullary cutting guide. The distal femur was cut in 4 degrees of valgus. The cut was verified and attention was then turned to the tibia. The tibia was subluxed forward. It was cut using external medullary alignment perpendicular to the mechanical axis. The knee was then brought into full extension and the extension gap was assessed. Sequential releases were performed in extension to form a rectangular space, confirmed using a spacer block. The collaterals were intact and overall limb alignment was appropriate.  The knee was then flexed to 90 degrees.  The flexion space was tensed at 90 degrees with a tensiometer to set external rotation, a flexion gap equal to the extension gap, and to measure the AP size.  The cutting block was then pinned in place.  I checked to ensure that rotation looked appropriate based on Ethan's line and transepicondylar line.  An ever wing was used to make sure I wouldn't notch anteriorly.  Anterior, posterior, chamfer, and box cuts for the femur were then made. The knee was then again tensed at 90 degrees and the meniscal remnants and posterior osteophytes were removed.  The posterior capsule was injected with a local anesthetic cocktail.  The tibia was then subluxed forward, sized, and punched in the appropriate amount of external rotation and mechanical alignment was verified using a drop radha. Trials were placed, the knee was reduced with a trial insert, it was taken through a range of motion, and  found to be stable in the varus/valgus plane 0-30 degrees of flexion and the AP plane at 90 degrees of flexion. Attention was then turned to the patella. A symmetric resection was performed to recreate native patella height and appropriately sized. All extraneous osteophyte and synovium were removed.  With a trial in place, the patella tracked centrally using the no thumbs technique. All trial components were removed. The real components were opened on the back table. The bone ends were copiously lavaged and dried. Two packs of cement were mixed and the real components were cemented into place. The knee was reduced with a trial insert in place and the cement was allowed to cure.     Once the cement cured, the tourniquet was released and meticulous hemostasis was obtained. All extraneous cement was removed from around the knee taking particular care to remove extraneous cement from the posterior aspect of the knee. The real insert was placed. Stability and patellar tracking were excellent. The knee was then copiously irrigated. The arthrotomy was closed with number 2 running barbed suture, and the wound was closed in layers.  An occlusive silver dressing was applied and the patient was turned over to anesthesia in stable condition without any apparent intraoperative complications.     Plan:  WBAT, PT/OT evaluation, Aspirin 81mg BID for 4 weeks for DVT prophylaxis, Leave dressing in place until followup.

## 2019-01-04 NOTE — PROGRESS NOTES
Report received at change of shift, care of pt assumed. Pt resting in bed with son at bedside. Reports feeling comfortable at this time. Surgical dressing clean, dry, and in tact. Pt denies any numbness or tingling. Polar ice and SCDs set up. Safety precautions in place.

## 2019-01-04 NOTE — PROGRESS NOTES
"Total Joint Replacement Program Rounding     Inpatient bedside rounding completed to address quality of care provided by total joint replacement program IDT and overall patient experience at [facility].    Patient Satisfaction addressed. Patient/family updated on POC during hospital stay.  Thorough safety education completed including use of call light prior to all mobility throughout the entirety of the hospital stay. Also educated on ankle pumps and incentive inspirometry use to prevent post-op complications.    Called MA at [facility] re: pt's request to rent a [facility] care unit with the plan to have the MA call the pt's cell phone to follow up.     Water provided per patient request.     No further questions/concerns at this time. Please see \"MyRounding\" for further details of rounding discussion. RN updated.         "

## 2019-01-04 NOTE — FLOWSHEET NOTE
01/03/19 2015   Interdisciplinary Plan of Care-Goals (Indications)   Hyperinflation Protocol Indications (POST-OP (ordered by surgeon))   Interdisciplinary Plan of Care-Outcomes    Hyperinflation Protocol Goals/Outcome Greater Than 60% of Predicted I.S. Volume x 24 hrs   Education   Education Yes - Pt. / Family has been Instructed in use of Respiratory Equipment   Incentive Spirometry Group   Incentive Spirometry Instruction Yes   Breathing Exercises Yes   Incentive Spirometer Volume 2500 mL   Incentive Spirometer Date Last Changed 01/03/19   Incentive Spirometer Next Change Date (Q 30 Days) 02/03/19   Respiratory WDL   Respiratory (WDL) WDL   Chest Exam   Respiration 17   Pulse 85   Breath Sounds   RUL Breath Sounds Clear   RML Breath Sounds Clear   RLL Breath Sounds Clear   LUAN Breath Sounds Clear   LLL Breath Sounds Clear   Oximetry   #Pulse Oximetry (Single Determination) Yes   Oxygen   Home O2 Use Prior To Admission? No   Pulse Oximetry 96 %   O2 (LPM) 1.5   O2 Daily Delivery Respiratory  Silicone Nasal Cannula

## 2019-01-04 NOTE — CARE PLAN
Problem: Knowledge Deficit  Goal: Knowledge of disease process/condition, treatment plan, diagnostic tests, and medications will improve  POC discussed. Pt understands she must urinate by midnight and walk 50ft as well. All questions and concerns addressed.     Problem: Pain Management  Goal: Pain level will decrease to patient's comfort goal  Will control pain with PRN pain meds.

## 2019-01-04 NOTE — DISCHARGE SUMMARY
Patient was admitted for a total knee arthroplasty.  There were no complications during the surgery. Did well post-operatively.     Recent Labs      01/04/19   0511   SODIUM  136   POTASSIUM  4.2   CHLORIDE  102   CO2  23   GLUCOSE  214*   BUN  15   CREATININE  0.90   CALCIUM  9.7     Recent Labs      01/04/19   0511   HEMOGLOBIN  13.4   HEMATOCRIT  39.7       There are no active hospital problems to display for this patient.      Uneventful hospital course.     Medication List      START taking these medications      Instructions   meloxicam 7.5 MG Tabs  Commonly known as:  MOBIC   Take 1 Tab by mouth every day for 30 days.  Dose:  7.5 mg     oxyCODONE immediate-release 5 MG Tabs  Commonly known as:  ROXICODONE   Take 1-2 Tabs by mouth every four hours as needed for Severe Pain for up to 5 days.  Dose:  5-10 mg        CHANGE how you take these medications      Instructions   tramadol 50 MG Tabs  What changed:  how much to take  Commonly known as:  ULTRAM   Take 1-2 Tabs by mouth every 6 hours as needed for up to 10 days.  Dose:   mg        CONTINUE taking these medications      Instructions   acetaminophen 500 MG Tabs  Commonly known as:  TYLENOL   Take 1,000 mg by mouth every 6 hours as needed.  Dose:  1000 mg     benazepril 10 MG Tabs  Commonly known as:  LOTENSIN   Take 10 mg by mouth every day.  Dose:  10 mg     oxybutynin SR 5 MG Tb24  Commonly known as:  DITROPAN-XL   Take 5 mg by mouth every day.  Dose:  5 mg     simvastatin 40 MG Tabs  Commonly known as:  ZOCOR   Take 40 mg by mouth every evening.  Dose:  40 mg     triamterene-hctz 37.5-25 MG Tabs  Commonly known as:  MAXZIDE-25/DYAZIDE   Take 1 Tab by mouth every day.  Dose:  1 Tab        STOP taking these medications    ibuprofen 200 MG Tabs  Commonly known as:  MOTRIN              Patient will be discharged home and follow up with Dr. Acosta in 2 weeks, for which the patient already has scheduled. [facility] office phone number is [phone].  Patient to  begin Physical Therapy as currently scheduled.  Encourage ROM of the knee.     Instructions:  -Leave the bandage in place until your followup appointment in 2 weeks.  -Call if there is drainage beneath the bandage  -Use ice and elevation frequently to help with pain and swelling control  -Put as much weight as comfortable on the operative leg.  Use a walker to assist with balance.  -Take your blood clot prevention medication for 4 weeks after surgery.

## 2019-01-04 NOTE — PROGRESS NOTES
Received pt via bed.  Pt is AAO x4.  Pt reports a 7/10 R knee pain level.  Will Medicate per MAR.  R knee dressing in place, CDI. Polar ice placed, along with SCDs.  VS WNL.  R PIV patent, saline locked.  2L O2 running via NC post op.   Pt oriented to room.  Family at bedside.   POC discussed.  All needs met at this time.  Bed in low position.  Call light within reach.  Rounding in place.

## 2019-01-04 NOTE — DISCHARGE INSTRUCTIONS
Discharge Instructions    Discharged to home by car with relative. Discharged via wheelchair, hospital escort: Yes.  Special equipment needed: Not Applicable    Be sure to schedule a follow-up appointment with your primary care doctor or any specialists as instructed.     Leave the bandage in place until your followup appointment in 2 weeks.  -Call if there is drainage beneath the bandage  -Use ice and elevation frequently to help with pain and swelling control  -Put as much weight as comfortable on the operative leg.  Use a walker to assist with balance.  -Take your blood clot prevention medication for 4 weeks after surgery.    LAST ASA given 1/4/19 at 0500am.  LAST OXYCODONE given 1/4/19 at 1015am.       Discharge Plan:   Diet Plan: Discussed  Activity Level: Discussed  Confirmed Follow up Appointment: Patient to Call and Schedule Appointment  Confirmed Symptoms Management: Discussed  Medication Reconciliation Updated: Yes  Pneumococcal Vaccine Administered/Refused: Not given - Patient refused pneumococcal vaccine  Influenza Vaccine Indication: Patient Refuses    I understand that a diet low in cholesterol, fat, and sodium is recommended for good health. Unless I have been given specific instructions below for another diet, I accept this instruction as my diet prescription.   Other diet: Regular    Special Instructions: Discharge instructions for the Orthopedic Patient    Follow up with Primary Care Physician within 2 weeks of discharge to home, regarding:  Review of medications and diagnostic testing.  Surveillance for medical complications.  Workup and treatment of osteoporosis, if appropriate.     -Is this a Joint Replacement patient? Yes Total Joint Knee Replacement Discharge Instructions    Pain  - The goal is to slowly wean off the prescription pain medicine.  - Ice can be used for pain control.  20 minutes at a time is recommended, and never directly against your skin or incision.  - Most patients are off  the pain pills by 3 weeks; others may require a low level of pain medications for many months.  If your pain continues to be severe, follow up with your physician.  Infection    Knee joint infections; occur in fewer than 2% of patients. The most common causes of infection following total knee replacement surgery are from bacteria that enter the bloodstream during dental procedures, urinary tract infections, or skin infections. These bacteria can lodge around your knee replacement and cause an infection.  - Keep the incision as clean and dry as possible.  - Always wash your hands before touching your incision.  - Skin infections tend to develop around 7-10 days after surgery; most can be treated with oral antibiotics.  - Dental Care should be delayed for 3 months after surgery, your surgeon recommends taking a dose of antibiotics 1 hour prior to any dental procedure. After 2 years, most surgeons recommend antibiotics only before an extensive procedure.  Ask your surgeon what he recommends.  - Signs and symptoms of infection can include:  low grade fever, redness, pain, swelling and drainage from your incision.  Notify your surgeon immediately if you develop any of these symptoms.  Other instructions  - Bowel habits - constipation is extremely common and is caused by a combination of anesthesia, lack of mobility and pain medicine.  Use stool softeners or laxatives if necessary. It is important not to ignore this problem, as bowel obstructions can be a serious complication after joint replacement surgery.  - Mood/Energy Level - Many patients experience a lack of energy and endurance for up to 2-3 months after surgery.  Some may also feel down and can even become depressed.  This is likely due to the postoperative anemia, change in activity level, lack of sleep, pain medicine and just the emotional reaction to the surgery itself that is a big disruption in a person’s life.  This usually passes.  If symptoms persist,  follow up with your primary physician.  - Returning to work - Your surgeon will give you more specific instructions. Depending on the type of activities you perform, it may be 6 to 8 weeks before you return to work.   Generally, if you work a sedentary job requiring little standing or walking, most patients may return within 2-6 weeks.  Manual labor jobs involving walking, lifting and standing may take longer. Your surgeon’s office can provide a release to part-time or light duty work early on in your recovery and progress you to full duty as able.    - Driving - If your left knee was replaced and you have an automatic transmission, you may be able to begin driving in a week or so, provided you are no longer taking narcotic pain medication. If your right knee was replaced, avoid driving for 6 to 8 weeks. Remember that your reflexes may not be as sharp as before your surgery. Ask your surgeon for specific instructions.   - Avoiding falls - A fall during the first few weeks after surgery can damage your new knee and may result in a need for further surgery.   throw rugs and tack down loose carpeting.  Be aware of floor hazards such as pets, small objects or uneven surfaces.    - Airport Metal Detectors - The sensitivity of metal detectors varies and it is likely that your prosthesis will cause an alarm.  Inform the  of your artificial joint.  Diet  - Resume your normal diet as tolerated.  - It is important to achieve a healthy nutritional status by eating a well balanced diet on a regular basis.  - Your physician may recommend that you take iron and vitamin supplements.   - Continue to drink plenty of fluids.  Shower/Bathing  - You may shower as soon as you get home from the hospital unless otherwise instructed.  - Keep your incision out of water.  To keep the incision dry when showering, cover it with a plastic bag or plastic wrap.  - Pat incision dry if it gets wet.  Don’t rub.  - Do not  submerge in a bath until staples are out and the incision is completely healed. (Approximately 6-8 weeks)  Dressing Change:  Procedure (if recommended by your physician)  - Wash hands.  - Open all new dressing change materials.  - Remove old dressing and discard.  - Inspect incision for redness, increase in clear drainage, yellow/green drainage, odor and surrounding skin hot to touch.  -  ABD (large gauze) pad or “island dressing” by one corner and lay over the incision.  Be careful not to touch the inside of the dressing that will lay over the incision.  - Secure in place as instructed (Ace wrap or tape).    Swelling/Bruising    - Swelling can last from 3-6 months.  - Elevate your leg higher than your heart while reclining.   The first week you are home you should elevate your leg an equal amount of time, as you are active.    - Anti-inflammatory pills can be taken once you have stopped the blood thinners.  - The swelling is usually worse after you go home since you are upright for longer periods of time.  - Bruising is common and can involve the entire leg including the thigh, calf and even foot. Bruising often does not appear until after you arrive home and it can be quite dramatic- purple, black, and green.  The bruising you can see is not usually concerning and will subside without any treatment.      Blood Clot Prevention  Blood clots in the legs and the less common, but frightening, clots that travel to the lungs are a real focus of our preventative. Most patients are at standard risk for them, but those patients who are at higher risk include people who have had previous clots, a family history of clotting, smoking, diabetes, obesity, advanced age, use of estrogen and a sedentary lifestyle.    - Signs of blood clots in legs - Swelling in thigh, calf or ankle that does not go down with elevation.  Pain, heat and tenderness in calf, back of calf or groin area.  NOTE: blood clots can occur in either  leg.  - You have been receiving anticoagulant therapy (blood thinners) in the hospital and you may be instructed to continue at home depending on your risk factors.  - Your risk for developing a clot continues for up to 2-3 months after surgery.  You should avoid prolonged sitting and dehydration during that time (long air trips and car trips).  If you do take a trip during this time, please get up and move around every 1- 1.5 hours.  - If you are prescribed blood-thinning medication for home, follow instructions as directed. (Handouts provided if applicable).      Activity  Once home, you should continue to stay active. The key is to remember not to overdo it! While you can expect some good days and some bad days, you should notice a gradual improvement and a gradual increase in your endurance over the next 6 to 12 months. Exercise is a critical component of home care, particularly during the first few weeks after surgery.     - Normal activities of daily living You should be able to resume most within 3 to 6 weeks following surgery. Some pain with activity and at night is common for several weeks after surgery  -  Physical Therapy Exercises - Continue to do the exercises prescribed for at least two months after surgery. Riding a stationary bicycle can help maintain muscle tone and keep your knee flexible. Try to achieve the maximum degree of bending and extension possible. (handout provided by Therapist).  - Sexual Activity -. Your surgeon can tell you when it safe to resume sexual activity.    - Sleeping Positions - You can safely sleep on your back, on either side, or on your stomach.   - Other Activities - Walk as much as you like, but remember that walking is no substitute for the exercises your doctor and physical therapist will prescribe. Lower impact activities are preferred.  If you have specific questions, consult your Surgeon.    When to Call the Doctor   Call the physician if:   - Fever over 100.5?  F  - Increased pain, drainage, redness, odor or heat around the incision area  - Shaking chills  - Increased knee pain with activity and rest  - Increased pain in calf, tenderness or redness above or below the knee  - Increased swelling of calf, ankle, foot  - Sudden increased shortness of breath, sudden onset of chest pain, localized chest pain with coughing  - Incision opening  Or, if there are any questions or concerns about medications or care.       -Is this patient being discharged with medication to prevent blood clots?  No    · Is patient discharged on Warfarin / Coumadin?   No     Depression / Suicide Risk    As you are discharged from this [facility] facility, it is important to learn how to keep safe from harming yourself.    Recognize the warning signs:  · Abrupt changes in personality, positive or negative- including increase in energy   · Giving away possessions  · Change in eating patterns- significant weight changes-  positive or negative  · Change in sleeping patterns- unable to sleep or sleeping all the time   · Unwillingness or inability to communicate  · Depression  · Unusual sadness, discouragement and loneliness  · Talk of wanting to die  · Neglect of personal appearance   · Rebelliousness- reckless behavior  · Withdrawal from people/activities they love  · Confusion- inability to concentrate     If you or a loved one observes any of these behaviors or has concerns about self-harm, here's what you can do:  · Talk about it- your feelings and reasons for harming yourself  · Remove any means that you might use to hurt yourself (examples: pills, rope, extension cords, firearm)  · Get professional help from the community (Mental Health, Substance Abuse, psychological counseling)  · Do not be alone:Call your Safe Contact- someone whom you trust who will be there for you.  · Call your local CRISIS HOTLINE 481-7905 or 486-164-6080  · Call your local Children's Mobile Crisis Response Team [place] (592) 853-5857 or www.[employer].InsideMaps  · Call the toll free National Suicide Prevention Hotlines   · National Suicide Prevention Lifeline 074-551-OEQS (7751)  · National Outlisten Line Network 800-SUICIDE (187-3495)

## 2019-01-04 NOTE — PROGRESS NOTES
Pt discharged to home via wheelchair escorted with thisRN.  All discharge instructions given, questions and concerns addressed.   All prescriptions given.   PIV removed without complications.  Follow up appt discussed.   Pt has DME at home.  All belongings with pt,

## 2019-03-04 LAB — HBA1C MFR BLD: 7.1 % (ref 0–5.6)

## 2019-05-02 ENCOUNTER — OFFICE VISIT (OUTPATIENT)
Dept: MEDICAL GROUP | Facility: LAB | Age: 74
End: 2019-05-02
Payer: MEDICARE

## 2019-05-02 ENCOUNTER — TELEPHONE (OUTPATIENT)
Dept: MEDICAL GROUP | Facility: LAB | Age: 74
End: 2019-05-02

## 2019-05-02 VITALS
BODY MASS INDEX: 38.62 KG/M2 | DIASTOLIC BLOOD PRESSURE: 64 MMHG | WEIGHT: 218 LBS | OXYGEN SATURATION: 95 % | SYSTOLIC BLOOD PRESSURE: 126 MMHG | RESPIRATION RATE: 14 BRPM | HEART RATE: 68 BPM | HEIGHT: 63 IN | TEMPERATURE: 97.6 F

## 2019-05-02 DIAGNOSIS — Z12.31 ENCOUNTER FOR SCREENING MAMMOGRAM FOR BREAST CANCER: ICD-10-CM

## 2019-05-02 DIAGNOSIS — N39.46 MIXED STRESS AND URGE URINARY INCONTINENCE: ICD-10-CM

## 2019-05-02 DIAGNOSIS — Z96.651 STATUS POST RIGHT KNEE REPLACEMENT: ICD-10-CM

## 2019-05-02 DIAGNOSIS — R73.01 IMPAIRED FASTING GLUCOSE: ICD-10-CM

## 2019-05-02 DIAGNOSIS — I10 ESSENTIAL HYPERTENSION: ICD-10-CM

## 2019-05-02 DIAGNOSIS — E78.5 HYPERLIPIDEMIA, UNSPECIFIED HYPERLIPIDEMIA TYPE: ICD-10-CM

## 2019-05-02 PROCEDURE — 99204 OFFICE O/P NEW MOD 45 MIN: CPT | Performed by: NURSE PRACTITIONER

## 2019-05-02 RX ORDER — MULTIVIT WITH MINERALS/LUTEIN
1 TABLET ORAL DAILY
COMMUNITY

## 2019-05-02 NOTE — LETTER
[facility]  Klaudia Guillen, A.P.N.  [id number]  [place] [address]  Fax: [phone]   Authorization for Release/Disclosure of   Protected Health Information   Name: ROSENDO MORALES : 1945 SSN: [id number]   Address: [address] [place] [address] Phone:    [phone] (home)    I authorize the entity listed below to release/disclose the PHI below to:   [facility]/Klaudia Guillen, A.P.N. and Klaudia Guillen, A.P.N.   Provider or Entity Name:  [facility]   Address   City, [facility], Zip:               [address] [place] [address]   Phone:  [phone]      Fax:      [phone]        Reason for request: continuity of care   Information to be released:    [ X ] LAST COLONOSCOPY,  including any PATH REPORT and follow-up  [ X ] LAST FIT/COLOGUARD RESULT [  ] LAST DEXA  [  ] LAST MAMMOGRAM  [  ] LAST PAP  [  ] LAST LABS [  ] RETINA EXAM REPORT  [  ] IMMUNIZATION RECORDS  [  ] Release all info      [  ] Check here and initial the line next to each item to release ALL health information INCLUDING  _____ Care and treatment for drug and / or alcohol abuse  _____ HIV testing, infection status, or AIDS  _____ Genetic Testing    DATES OF SERVICE OR TIME PERIOD TO BE DISCLOSED: _____________  I understand and acknowledge that:  * This Authorization may be revoked at any time by you in writing, except if your health information has already been used or disclosed.  * Your health information that will be used or disclosed as a result of you signing this authorization could be re-disclosed by the recipient. If this occurs, your re-disclosed health information may no longer be protected by State or Federal laws.  * You may refuse to sign this Authorization. Your refusal will not affect your ability to obtain treatment.  * This Authorization becomes effective upon signing and will  on (date) __________.      If no date is indicated, this  Authorization will  one (1) year from the signature date.    Name: Brianne Wilson    Signature:Continuity of Care   Date:     2019       PLEASE FAX REQUESTED RECORDS BACK TO: [phone]

## 2019-05-02 NOTE — PROGRESS NOTES
Chief Complaint   Patient presents with   • Knee Pain       DORYS Decker is a 73-year-old female, here to establish care today.  [place] and has had a [facility] for approximately 2 years.  She has a past medical history of hypertension, impaired fasting glucose/prediabetes, urinary incontinence, diverticulosis and hyperlipidemia.  Most recently she is gone through a right knee replacement which she states went well until she fell and fractured the top of her femur.  Working with Dr. Acosta, orthopedics and now in PT @ [facility].  Off all pain medication and driving again.  Feels she is making good progress.    Urinary incontinence: Seeing Dr. Jimenez and Cleopatra Kidd for urogyn issues - using vaginal cream and pelvic floor exercises.  Denies dysuria, hematuria or lower abdominal pain.    HTN:  Chronic issue.  Compliant with benazepril 10 mg & maxzide 37.5/25 daily.  Not checking BP at home regularly.  Denies CP.  No hx of MI or Stroke.      Hyperlipidemia: Tells me that she is compliant with simvastatin.  Denies chronic myalgias related to simvastatin.  Again as above, she has a comorbid condition of hypertension, prediabetes, obesity.  She is never had a heart attack or stroke.  She is a non-smoker.    Prediabetes: Chronic issue.  Last fasting blood sugar was done on March 4 and was 163, A1c was 7.1 and she was started on metformin.  She tells me that she really does not feel that she needs to be on metformin.  She is concerned that metformin made her dizzy although she was also on chronic opiate therapy at that point.  She would like to have her labs redone.  Right now she is not exercising outside of physical therapy.  Tells me that she eats pretty healthy but enjoys white carbohydrates.  Rarely drinks alcohol.    HCM:    Mammogram [facility] 2 yrs ago  Colonoscopy 1 yr ago - repeat 5 yrs.   Blood work [facility] 1 mo ago  Dexa:  4-5 yrs ago - normal  Refuses flu  shots    Family History   Problem Relation Age of Onset   • Other Father         MVA / head injury / blood clot to heart   • Hypertension Sister      Past Surgical History:   Procedure Laterality Date   • KNEE ARTHROPLASTY TOTAL Right 1/3/2019    Procedure: KNEE ARTHROPLASTY TOTAL;  Surgeon: Mil Acosta M.D.;  Location: SURGERY [facility];  Service: Orthopedics   • COLONOSCOPY  01/2018   • COLONOSCOPY  2015    polyp removed   • CHOLECYSTECTOMY  1974    with appendectomy   • PRIMARY C SECTION  05/24/1971   • TONSILLECTOMY  1960   • LUMPECTOMY Left 1990's    breast-negative     Review Of Systems  Denies fever, chills, or sweats, unexplained weight changes.  Skin: negative for rash, changing moles, abnormal pigmentation, hair or nail changes.  Eyes: negative for visual blurring, double vision, eye pain, floaters and discharge from eyes  Ears/Nose/Throat: negative for tinnitus, vertigo, oral or dental problem, hoarseness, frequent URI's  Respiratory: negative for persistent cough, hemoptysis, dyspnea, wheezing  Cardiovascular: negative for palpitations, tachycardia, irregular heart beat, chest pain or pressure or peripheral edema.   Breast: Denies breast tenderness or masses  Gastrointestinal: negative for dysphagia or odynophagia, nausea, heartburn or reflux, abdominal pain, hemorrhoids, constipation or diarrhea, black stool or bloody stool  Genitourinary: negative for dysuria, frequency.  Positive chronic incontinence.  Musculoskeletal: Positive bilateral knee pain  Neurologic: negative for new or changing headaches, new weakness tremor  Psychiatric: negative for mood disturbance, anxiety, depression.  She is not sexually active.  Hematologic/Lymphatic/Immunologic: negative for pallor, unusual bruising, swollen glands.  Endocrine: negative for temperature intolerance, polydipsia, polyuria.    Exam:  /64 (BP Location: Left arm, Patient Position: Sitting, BP Cuff Size: Adult)   Pulse 68   Temp 36.4 °C  "(97.6 °F) (Temporal)   Resp 14   Ht 1.6 m (5' 3\")   Wt 98.9 kg (218 lb)   SpO2 95%   Constitutional: Alert, no distress, plus 3 vital signs  Skin:  Warm, dry, no rashes invisible areas  Eye: Equal, round and reactive, conjunctiva clear  ENMT: Lips without lesions, good dentition, oropharynx clear    Neck: Trachea midline, no masses, no thyromegaly  Respiratory: Unlabored respiration, lungs clear to auscultation, no wheezes, no rhonchi  Cardiovascular: Normal rate and rhythm  Psych: Alert, pleasant, well-groomed, normal affect    Assessment / Plan / Medical Decision makin. Essential hypertension  -Stable.  Continue same.  Follow-up 6 months.    2. Impaired fasting glucose  -Unfortunately I did not have her lab work while she was here and when it arrived that showed an A1c of 7.1.  She will be notified that she does have type 2 diabetes and I would recommend that she take metformin.  I recommend that she recheck her A1c in 6 weeks.  Discussed with her the importance of avoiding white carbohydrates and starting an exercise program when she is physically capable, considering her knee replacement.  Encouraged her to be seen here every 3 months for type 2 diabetes until she feels more comfortable with the diagnoses and then she may move to every 6 months.    3. Hyperlipidemia, unspecified hyperlipidemia type  -Most recent lipid panel shows an LDL cholesterol of 116, triglycerides of 199 and HDL cholesterol 38.  She will continue simvastatin.  Discussed that a diabetic diet will also help improve her triglyceride level.  We will recheck this in 6 months.    4. Encounter for screening mammogram for breast cancer  - MA-SCREENING MAMMO BILAT W/TOMOSYNTHESIS W/CAD; Future    5. Mixed stress and urge urinary incontinence  -Now followed by urogynecology.    6. Status post right knee replacement  -Doing well and closely followed by orthopedics as well as physical therapy.    Total face to face time 45 minutes of which " over 50% of this visit is spent in counseling, education and outlining a plan of treatment and coordination of care for the above conditions. This included but was not limited to discussion of medication options and potential risks related to the medications, referral and specialty care options. All patient questions were answered

## 2019-05-02 NOTE — TELEPHONE ENCOUNTER
----- Message from JOSE Lovett sent at 5/2/2019  2:26 PM PDT -----  Please obtain colonoscopy from last year from [facility]. Thanks!

## 2019-06-13 ENCOUNTER — OFFICE VISIT (OUTPATIENT)
Dept: MEDICAL GROUP | Facility: LAB | Age: 74
End: 2019-06-13
Payer: MEDICARE

## 2019-06-13 VITALS
WEIGHT: 218 LBS | RESPIRATION RATE: 16 BRPM | HEIGHT: 63 IN | SYSTOLIC BLOOD PRESSURE: 110 MMHG | OXYGEN SATURATION: 96 % | HEART RATE: 68 BPM | TEMPERATURE: 97.8 F | BODY MASS INDEX: 38.62 KG/M2 | DIASTOLIC BLOOD PRESSURE: 70 MMHG

## 2019-06-13 DIAGNOSIS — Z96.651 STATUS POST RIGHT KNEE REPLACEMENT: ICD-10-CM

## 2019-06-13 DIAGNOSIS — I10 ESSENTIAL HYPERTENSION: ICD-10-CM

## 2019-06-13 DIAGNOSIS — E11.9 CONTROLLED TYPE 2 DIABETES MELLITUS WITHOUT COMPLICATION, WITHOUT LONG-TERM CURRENT USE OF INSULIN (HCC): ICD-10-CM

## 2019-06-13 PROBLEM — R73.01 IMPAIRED FASTING GLUCOSE: Status: RESOLVED | Noted: 2019-05-02 | Resolved: 2019-06-13

## 2019-06-13 PROCEDURE — 99214 OFFICE O/P EST MOD 30 MIN: CPT | Performed by: NURSE PRACTITIONER

## 2019-06-13 ASSESSMENT — PATIENT HEALTH QUESTIONNAIRE - PHQ9: CLINICAL INTERPRETATION OF PHQ2 SCORE: 0

## 2019-06-13 NOTE — PROGRESS NOTES
"Chief Complaint   Patient presents with   • Diabetes       HPI  Brianne is a 73-year-old established female here to follow-up on elevated A1C:  Newly dx issue, patient tells me that she was previously prediabetic.  She does mention that she went through a lot of stress over the past 6 months after she had a knee replacement and then fell, fracturing the top of her femur.  She was very sedentary for several months.  Also tells me that she has a sweet tooth and has been trying to cut down on this.  Enjoys eggs and cheese.  Eating a lot of taco salads containing beans.  Occasional yogert / strawberries.  Not a morning person - up by 9:30 to10.  Eats more at night.  She does not check her blood sugars.  She was given a prescription of metformin several months ago by different primary care and did not start it.  Exercise:  Not doing much of this except PT.  Using cane b/c of knee replacement.    Denies numbness and tingling in her feet, vision changes but she does urinate frequently.  She also has comorbid conditions of hypertension and hyperlipidemia.  She is obese.  She does not smoke.      Past medical, surgical, family, and social history is reviewed and updated in Epic chart by me today.   Medications and allergies reviewed and updated in Epic chart by me today.     ROS:   As documented in history of present illness above    Exam:  /70 (BP Location: Left arm, Patient Position: Sitting, BP Cuff Size: Large adult)   Pulse 68   Temp 36.6 °C (97.8 °F)   Resp 16   Ht 1.6 m (5' 3\")   Wt 98.9 kg (218 lb)   SpO2 96%   Constitutional: Alert, no distress, plus 3 vital signs  Skin:  Warm, dry, no rashes invisible areas  Eye: Equal, round and reactive, conjunctiva clear  ENMT: Lips without lesions  Respiratory: Unlabored respiration  Cardiovascular: Normal rate and rhythm  Psych: Alert, pleasant, well-groomed, normal affect    Assessment / Plan / Medical Decision makin. Controlled type 2 diabetes mellitus " without complication, without long-term current use of insulin ([facility])  -Discussed her diagnosis of type 2 diabetes with her in depth.  We went through each of her meals and discussed ways in which she could change this.  We also discussed ways in which she can incorporate more exercise.  She prefers not to check her blood sugars at this point.  She is willing to repeat an A1c with a microalbumin and a BMP and then follow-up here in 3 months.  I encouraged her to work on about a 10 pound weight loss every 1 to 2 months.  Discussed portion control and a diabetic diet.  Encouraged her to see her eye doctor every year and to take good care of her feet.  - HEMOGLOBIN A1C; Future  - MICROALBUMIN CREAT RATIO URINE; Future  - Basic Metabolic Panel; Future    2. Essential hypertension  -Stable.  Continue same.    3. Status post right knee replacement  -She is still in physical therapy and making efforts at more physical activity.  Off pain medication.  Still followed by orthopedics.    4. BMI 38.0-38.9,adult  -As above, encouraged a 5 to 10 pound weight loss every 1 to 2 months until she has lost approximately 30 to 40 pounds.

## 2019-08-13 RX ORDER — TRIAMTERENE AND HYDROCHLOROTHIAZIDE 37.5; 25 MG/1; MG/1
1 TABLET ORAL DAILY
Qty: 90 TAB | Refills: 3 | Status: SHIPPED | OUTPATIENT
Start: 2019-08-13 | End: 2020-06-22 | Stop reason: SDUPTHER

## 2019-08-22 RX ORDER — BENAZEPRIL HYDROCHLORIDE 10 MG/1
10 TABLET ORAL DAILY
Qty: 90 TAB | Refills: 3 | Status: SHIPPED | OUTPATIENT
Start: 2019-08-22 | End: 2020-06-22 | Stop reason: SDUPTHER

## 2019-08-22 RX ORDER — SIMVASTATIN 40 MG
40 TABLET ORAL EVERY EVENING
Qty: 90 TAB | Refills: 3 | Status: SHIPPED | OUTPATIENT
Start: 2019-08-22 | End: 2020-06-22 | Stop reason: SDUPTHER

## 2019-09-19 ENCOUNTER — APPOINTMENT (OUTPATIENT)
Dept: MEDICAL GROUP | Facility: LAB | Age: 74
End: 2019-09-19
Payer: MEDICARE

## 2019-12-10 ENCOUNTER — OFFICE VISIT (OUTPATIENT)
Dept: MEDICAL GROUP | Facility: LAB | Age: 74
End: 2019-12-10
Payer: MEDICARE

## 2019-12-10 VITALS
SYSTOLIC BLOOD PRESSURE: 130 MMHG | OXYGEN SATURATION: 99 % | WEIGHT: 232 LBS | HEIGHT: 63 IN | HEART RATE: 62 BPM | BODY MASS INDEX: 41.11 KG/M2 | DIASTOLIC BLOOD PRESSURE: 70 MMHG | TEMPERATURE: 98 F | RESPIRATION RATE: 16 BRPM

## 2019-12-10 DIAGNOSIS — N39.46 MIXED STRESS AND URGE URINARY INCONTINENCE: ICD-10-CM

## 2019-12-10 DIAGNOSIS — E11.9 CONTROLLED TYPE 2 DIABETES MELLITUS WITHOUT COMPLICATION, WITHOUT LONG-TERM CURRENT USE OF INSULIN (HCC): ICD-10-CM

## 2019-12-10 DIAGNOSIS — R01.1 NEWLY RECOGNIZED HEART MURMUR: ICD-10-CM

## 2019-12-10 DIAGNOSIS — I10 ESSENTIAL HYPERTENSION: ICD-10-CM

## 2019-12-10 LAB
HBA1C MFR BLD: 6.7 % (ref 0–5.6)
INT CON NEG: NEGATIVE
INT CON POS: POSITIVE

## 2019-12-10 PROCEDURE — 99214 OFFICE O/P EST MOD 30 MIN: CPT | Performed by: NURSE PRACTITIONER

## 2019-12-10 PROCEDURE — 83036 HEMOGLOBIN GLYCOSYLATED A1C: CPT | Performed by: NURSE PRACTITIONER

## 2019-12-10 NOTE — ASSESSMENT & PLAN NOTE
Chronic issue.  Taking metformin 500 mg once daily - denies problems with this.  Has not started exercising / dieting.  Denies foot pain / burning.  Denies dysuria / increased frequency.

## 2019-12-10 NOTE — PROGRESS NOTES
"CC  f/u    HPI  Brianne is a 73 yo est female here to f/u on chronic issues.  Overall physically feeling fine, denies any specific complaints.  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    #1 -Controlled type 2 diabetes mellitus without complication, without long-term current use of insulin (HCC)  Chronic issue.  Taking metformin 500 mg once daily - denies problems with this.  Has not started exercising / dieting.  Denies foot pain / burning.  Denies dysuria / increased frequency.  Positive weight gain.    #2-mixed stress and urge urinary incontinence  Chronic issue.  Persistent but improving with kegel exercises and vaginal estrogen cream.      #3-essential hypertension  Chronic issue.  BP doing well with benazepril and triamterine / hctz.  Denies chest pain.     Past medical, surgical, family, and social history is reviewed and updated in Epic chart by me today.   Medications and allergies reviewed and updated in Epic chart by me today.     ROS:   Denies hematuria or dysuria.  Denies vomiting or diarrhea.    Exam:  /70 (BP Location: Right arm, Patient Position: Sitting, BP Cuff Size: Large adult)   Pulse 62   Temp 36.7 °C (98 °F)   Resp 16   Ht 1.6 m (5' 3\")   Wt 105.2 kg (232 lb)   SpO2 99%   Constitutional: Overweight female, alert, no distress, plus 3 vital signs  Skin:  Warm, dry, no rashes invisible areas  Eye: Equal, round and reactive, conjunctiva clear  ENMT: Lips without lesions.  Neck: Trachea midline, no masses, no thyromegaly  Respiratory: Unlabored respiration, lungs clear to auscultation, no wheezes, no rhonchi  Cardiovascular: Normal rate and rhythm.  + 3/6 murmur.  + LE edema bilateral but nonpitting.   Abdomen: Soft, nontender.  Psych: Alert, pleasant, well-groomed, normal affect  Monofilament testing with a 10 gram force: sensation intact in 4/4 bilaterally.   Visual Inspection: Feet without maceration, ulcers, fissures.       Assessment:  1. Controlled type 2 diabetes mellitus without " complication, without long-term current use of insulin (HCC)  Diabetic Monofilament Lower Extremity Exam   2. Mixed stress and urge urinary incontinence     3. Essential hypertension         Plan / Medical Decision making:   Diabetes is improving, a1c down to 6.7% from 7.1.%.  Repeat labs 6 mo and f/u here.  Declines all vaccines today.  We discussed the importance of starting an exercise program, she plans on joining [employer] to do water aerobics.  Reiterated the importance of a diabetic diet.  Discussed long-term repercussions of type 2 diabetes.    Newly appreciated murmur, recommend echocardiogram.  Discussed differential diagnoses of this murmur with her as well as treatment and underlying causes.    She will continue with Kegel exercises and vaginal estrogen for urinary incontinence.  Denies any symptoms of infection.

## 2020-01-21 ENCOUNTER — HOSPITAL ENCOUNTER (OUTPATIENT)
Dept: CARDIOLOGY | Facility: MEDICAL CENTER | Age: 75
End: 2020-01-21
Attending: NURSE PRACTITIONER
Payer: MEDICARE

## 2020-01-21 DIAGNOSIS — R01.1 NEWLY RECOGNIZED HEART MURMUR: ICD-10-CM

## 2020-01-21 LAB
LV EJECT FRACT  99904: 65
LV EJECT FRACT MOD 2C 99903: 69.13
LV EJECT FRACT MOD 4C 99902: 61.59
LV EJECT FRACT MOD BP 99901: 64.93

## 2020-01-21 PROCEDURE — 93306 TTE W/DOPPLER COMPLETE: CPT

## 2020-01-21 PROCEDURE — 93306 TTE W/DOPPLER COMPLETE: CPT | Mod: 26 | Performed by: INTERNAL MEDICINE

## 2020-01-22 PROBLEM — I77.819 AORTIC DILATATION (HCC): Status: ACTIVE | Noted: 2020-01-22

## 2020-01-22 PROBLEM — I35.0 AORTIC STENOSIS, MILD: Status: ACTIVE | Noted: 2020-01-22

## 2020-06-02 ENCOUNTER — HOSPITAL ENCOUNTER (OUTPATIENT)
Dept: LAB | Facility: MEDICAL CENTER | Age: 75
End: 2020-06-02
Attending: NURSE PRACTITIONER
Payer: MEDICARE

## 2020-06-02 DIAGNOSIS — E11.9 CONTROLLED TYPE 2 DIABETES MELLITUS WITHOUT COMPLICATION, WITHOUT LONG-TERM CURRENT USE OF INSULIN (HCC): ICD-10-CM

## 2020-06-02 LAB
BASOPHILS # BLD AUTO: 1 % (ref 0–1.8)
BASOPHILS # BLD: 0.07 K/UL (ref 0–0.12)
EOSINOPHIL # BLD AUTO: 0.14 K/UL (ref 0–0.51)
EOSINOPHIL NFR BLD: 2.1 % (ref 0–6.9)
ERYTHROCYTE [DISTWIDTH] IN BLOOD BY AUTOMATED COUNT: 43.8 FL (ref 35.9–50)
EST. AVERAGE GLUCOSE BLD GHB EST-MCNC: 151 MG/DL
HBA1C MFR BLD: 6.9 % (ref 0–5.6)
HCT VFR BLD AUTO: 40.1 % (ref 37–47)
HGB BLD-MCNC: 13.4 G/DL (ref 12–16)
IMM GRANULOCYTES # BLD AUTO: 0.02 K/UL (ref 0–0.11)
IMM GRANULOCYTES NFR BLD AUTO: 0.3 % (ref 0–0.9)
LYMPHOCYTES # BLD AUTO: 1.48 K/UL (ref 1–4.8)
LYMPHOCYTES NFR BLD: 21.8 % (ref 22–41)
MCH RBC QN AUTO: 31.8 PG (ref 27–33)
MCHC RBC AUTO-ENTMCNC: 33.4 G/DL (ref 33.6–35)
MCV RBC AUTO: 95 FL (ref 81.4–97.8)
MONOCYTES # BLD AUTO: 0.63 K/UL (ref 0–0.85)
MONOCYTES NFR BLD AUTO: 9.3 % (ref 0–13.4)
NEUTROPHILS # BLD AUTO: 4.44 K/UL (ref 2–7.15)
NEUTROPHILS NFR BLD: 65.5 % (ref 44–72)
NRBC # BLD AUTO: 0 K/UL
NRBC BLD-RTO: 0 /100 WBC
PLATELET # BLD AUTO: 205 K/UL (ref 164–446)
PMV BLD AUTO: 10.6 FL (ref 9–12.9)
RBC # BLD AUTO: 4.22 M/UL (ref 4.2–5.4)
WBC # BLD AUTO: 6.8 K/UL (ref 4.8–10.8)

## 2020-06-02 PROCEDURE — 82570 ASSAY OF URINE CREATININE: CPT

## 2020-06-02 PROCEDURE — 80053 COMPREHEN METABOLIC PANEL: CPT

## 2020-06-02 PROCEDURE — 83036 HEMOGLOBIN GLYCOSYLATED A1C: CPT | Mod: GA

## 2020-06-02 PROCEDURE — 85025 COMPLETE CBC W/AUTO DIFF WBC: CPT

## 2020-06-02 PROCEDURE — 80061 LIPID PANEL: CPT

## 2020-06-02 PROCEDURE — 36415 COLL VENOUS BLD VENIPUNCTURE: CPT | Mod: GA

## 2020-06-02 PROCEDURE — 82043 UR ALBUMIN QUANTITATIVE: CPT

## 2020-06-03 LAB
ALBUMIN SERPL BCP-MCNC: 3.8 G/DL (ref 3.2–4.9)
ALBUMIN/GLOB SERPL: 1.5 G/DL
ALP SERPL-CCNC: 90 U/L (ref 30–99)
ALT SERPL-CCNC: 12 U/L (ref 2–50)
ANION GAP SERPL CALC-SCNC: 14 MMOL/L (ref 7–16)
AST SERPL-CCNC: 13 U/L (ref 12–45)
BILIRUB SERPL-MCNC: 0.5 MG/DL (ref 0.1–1.5)
BUN SERPL-MCNC: 18 MG/DL (ref 8–22)
CALCIUM SERPL-MCNC: 9.6 MG/DL (ref 8.5–10.5)
CHLORIDE SERPL-SCNC: 104 MMOL/L (ref 96–112)
CHOLEST SERPL-MCNC: 173 MG/DL (ref 100–199)
CO2 SERPL-SCNC: 23 MMOL/L (ref 20–33)
CREAT SERPL-MCNC: 0.64 MG/DL (ref 0.5–1.4)
CREAT UR-MCNC: 188.42 MG/DL
FASTING STATUS PATIENT QL REPORTED: NORMAL
GLOBULIN SER CALC-MCNC: 2.5 G/DL (ref 1.9–3.5)
GLUCOSE SERPL-MCNC: 162 MG/DL (ref 65–99)
HDLC SERPL-MCNC: 45 MG/DL
LDLC SERPL CALC-MCNC: 110 MG/DL
MICROALBUMIN UR-MCNC: <1.2 MG/DL
MICROALBUMIN/CREAT UR: NORMAL MG/G (ref 0–30)
POTASSIUM SERPL-SCNC: 3.8 MMOL/L (ref 3.6–5.5)
PROT SERPL-MCNC: 6.3 G/DL (ref 6–8.2)
SODIUM SERPL-SCNC: 141 MMOL/L (ref 135–145)
TRIGL SERPL-MCNC: 89 MG/DL (ref 0–149)

## 2020-06-09 ENCOUNTER — OFFICE VISIT (OUTPATIENT)
Dept: MEDICAL GROUP | Facility: LAB | Age: 75
End: 2020-06-09
Payer: MEDICARE

## 2020-06-09 VITALS
HEART RATE: 82 BPM | SYSTOLIC BLOOD PRESSURE: 120 MMHG | RESPIRATION RATE: 16 BRPM | TEMPERATURE: 98.2 F | OXYGEN SATURATION: 96 % | WEIGHT: 236 LBS | DIASTOLIC BLOOD PRESSURE: 76 MMHG | HEIGHT: 63 IN | BODY MASS INDEX: 41.82 KG/M2

## 2020-06-09 DIAGNOSIS — Z12.31 ENCOUNTER FOR SCREENING MAMMOGRAM FOR BREAST CANCER: ICD-10-CM

## 2020-06-09 DIAGNOSIS — Z78.0 MENOPAUSE: ICD-10-CM

## 2020-06-09 DIAGNOSIS — E11.9 CONTROLLED TYPE 2 DIABETES MELLITUS WITHOUT COMPLICATION, WITHOUT LONG-TERM CURRENT USE OF INSULIN (HCC): ICD-10-CM

## 2020-06-09 DIAGNOSIS — I10 ESSENTIAL HYPERTENSION: ICD-10-CM

## 2020-06-09 DIAGNOSIS — E66.01 MORBID OBESITY (HCC): ICD-10-CM

## 2020-06-09 PROCEDURE — 99214 OFFICE O/P EST MOD 30 MIN: CPT | Performed by: NURSE PRACTITIONER

## 2020-06-09 ASSESSMENT — FIBROSIS 4 INDEX: FIB4 SCORE: 1.39

## 2020-06-09 ASSESSMENT — PATIENT HEALTH QUESTIONNAIRE - PHQ9: CLINICAL INTERPRETATION OF PHQ2 SCORE: 0

## 2020-06-09 NOTE — ASSESSMENT & PLAN NOTE
Chronic issue.  Taking metformin 500 mg daily.  Physically inactive x a few months.    Component      Latest Ref Rng & Units 3/4/2019 12/10/2019 6/2/2020 6/2/2020          12:00 AM  2:55 PM 11:45 AM 11:45 AM   Glycohemoglobin      0.0 - 5.6 % 7.1 (A) 6.7 (A)  6.9 (H)   Internal Control Negative        Negative

## 2020-06-09 NOTE — PROGRESS NOTES
"Chief Complaint   Patient presents with   • Diabetes     follow up labs       HPI  Nhi is a 74-year-old established female here to follow-up on chronic issues of hypertension, type 2 diabetes and morbid obesity.  Overall physically feeling fine.  Seeing orthopedics regularly regarding complications of a right knee replacement.    #1-controlled type 2 diabetes mellitus without complication, without long-term current use of insulin (HCC)  Chronic issue.  Taking metformin 500 mg daily.  Physically inactive x a few months.  Denies numbness or tingling in her feet, vision changes or any issues with urination beyond frequency.  Struggles to stay away from sweets.  Component      Latest Ref Rng & Units 3/4/2019 12/10/2019 6/2/2020 6/2/2020          12:00 AM  2:55 PM 11:45 AM 11:45 AM   Glycohemoglobin      0.0 - 5.6 % 7.1 (A) 6.7 (A)  6.9 (H)   Internal Control Negative        Negative         #2-hypertension: Chronic issue.  Compliant with benazepril as well as triamterene/HCTZ.  Not monitoring home blood pressures.  Denies chest pain.  Has chronic lower extremity edema.  Denies shortness of breath.    #3-morbid obesity: Chronic issue.  Unfortunately worsening.  Has gained about 4 pounds since our visit 6 months ago.  Admits that she has been very physically inactive and enjoying too many sweets.  She did try Mediterranean diet with a coconut component cause severe diarrhea.    Past medical, surgical, family, and social history is reviewed and updated in Epic chart by me today.   Medications and allergies reviewed and updated in Epic chart by me today.     ROS:   As documented in history of present illness above    Exam:  /76 (BP Location: Left arm, Patient Position: Sitting, BP Cuff Size: Large adult)   Pulse 82   Temp 36.8 °C (98.2 °F)   Resp 16   Ht 1.6 m (5' 3\")   Wt 107 kg (236 lb)   SpO2 96%   Constitutional: Overweight female, alert, no distress, plus 3 vital signs  Skin:  Warm, dry, no rashes " "invisible areas  Eye: Equal, round and reactive, conjunctiva clear  ENMT: Lips without lesions.  Respiratory: Unlabored respiration.  Cardiovascular: Normal rate.  Psych: Alert, pleasant, well-groomed, normal affect    Assessment / Plan / Medical Decision making:   \"  1. Controlled type 2 diabetes mellitus without complication, without long-term current use of insulin (HCC)     2. Morbid obesity (HCC)     3. Menopause  DS-BONE DENSITY STUDY ([facility])   4. Encounter for screening mammogram for breast cancer  MA-SCREENING MAMMO BILAT W/CAD   5. Essential hypertension     \"  Diabetes stable.  Discussed goal A1c at 6.5 or below.  Encouraged her to reduce her intake of sugar/sweets and white carbohydrates as well as increase her physical activity regularity.  Encouraged portion control and weight loss efforts.  Recheck A1c in our office in 6 months.  Blood pressure is well controlled, continue current medications.  Recommend updated mammogram and bone density.    Follow-up here in 6 months for point-of-care A1c.  "

## 2020-06-22 RX ORDER — BENAZEPRIL HYDROCHLORIDE 10 MG/1
10 TABLET ORAL DAILY
Qty: 90 TAB | Refills: 3 | Status: SHIPPED | OUTPATIENT
Start: 2020-06-22 | End: 2021-07-14

## 2020-06-22 RX ORDER — SIMVASTATIN 40 MG
40 TABLET ORAL EVERY EVENING
Qty: 90 TAB | Refills: 3 | Status: SHIPPED | OUTPATIENT
Start: 2020-06-22 | End: 2021-08-26

## 2020-06-22 RX ORDER — TRIAMTERENE AND HYDROCHLOROTHIAZIDE 37.5; 25 MG/1; MG/1
1 TABLET ORAL DAILY
Qty: 90 TAB | Refills: 3 | Status: SHIPPED | OUTPATIENT
Start: 2020-06-22 | End: 2021-07-14

## 2020-09-04 ENCOUNTER — PATIENT MESSAGE (OUTPATIENT)
Dept: MEDICAL GROUP | Facility: LAB | Age: 75
End: 2020-09-04

## 2020-09-04 DIAGNOSIS — K52.9 CHRONIC DIARRHEA: ICD-10-CM

## 2020-09-04 NOTE — TELEPHONE ENCOUNTER
----- Message from Brianne Wilson sent at 9/4/2020  2:58 PM PDT -----  Regarding: Non-Urgent Medical Question  Contact: [phone]  Dr. Guillen,  I would like to see a gastroenterologist, could you refer a DrAzalea For me? I have been having chronic diarrhea for the past year or so , and have had to take Imodium daily to keep it under control. I believe I should see what the cause may be.     Thank you,  Brianne

## 2020-09-11 ENCOUNTER — HOSPITAL ENCOUNTER (OUTPATIENT)
Dept: RADIOLOGY | Facility: MEDICAL CENTER | Age: 75
End: 2020-09-11
Payer: MEDICARE

## 2020-09-29 ENCOUNTER — HOSPITAL ENCOUNTER (OUTPATIENT)
Dept: RADIOLOGY | Facility: MEDICAL CENTER | Age: 75
End: 2020-09-29
Attending: NURSE PRACTITIONER
Payer: MEDICARE

## 2020-09-29 DIAGNOSIS — Z12.31 ENCOUNTER FOR SCREENING MAMMOGRAM FOR BREAST CANCER: ICD-10-CM

## 2020-09-29 DIAGNOSIS — Z78.0 MENOPAUSE: ICD-10-CM

## 2020-09-29 PROCEDURE — 77067 SCR MAMMO BI INCL CAD: CPT

## 2020-09-29 PROCEDURE — 77080 DXA BONE DENSITY AXIAL: CPT

## 2020-12-16 ENCOUNTER — OFFICE VISIT (OUTPATIENT)
Dept: MEDICAL GROUP | Facility: LAB | Age: 75
End: 2020-12-16
Payer: MEDICARE

## 2020-12-16 VITALS
RESPIRATION RATE: 16 BRPM | HEART RATE: 70 BPM | TEMPERATURE: 97.7 F | BODY MASS INDEX: 41.99 KG/M2 | WEIGHT: 237 LBS | OXYGEN SATURATION: 96 % | SYSTOLIC BLOOD PRESSURE: 130 MMHG | DIASTOLIC BLOOD PRESSURE: 80 MMHG | HEIGHT: 63 IN

## 2020-12-16 DIAGNOSIS — E11.9 CONTROLLED TYPE 2 DIABETES MELLITUS WITHOUT COMPLICATION, WITHOUT LONG-TERM CURRENT USE OF INSULIN (HCC): ICD-10-CM

## 2020-12-16 DIAGNOSIS — N39.0 ACUTE UTI: ICD-10-CM

## 2020-12-16 DIAGNOSIS — R60.0 BILATERAL LOWER EXTREMITY EDEMA: ICD-10-CM

## 2020-12-16 DIAGNOSIS — E78.5 HYPERLIPIDEMIA, UNSPECIFIED HYPERLIPIDEMIA TYPE: ICD-10-CM

## 2020-12-16 LAB
APPEARANCE UR: NORMAL
BILIRUB UR STRIP-MCNC: NORMAL MG/DL
COLOR UR AUTO: YELLOW
GLUCOSE UR STRIP.AUTO-MCNC: NORMAL MG/DL
HBA1C MFR BLD: 7.3 % (ref 0–5.6)
INT CON NEG: NEGATIVE
INT CON POS: POSITIVE
KETONES UR STRIP.AUTO-MCNC: NORMAL MG/DL
LEUKOCYTE ESTERASE UR QL STRIP.AUTO: NORMAL
NITRITE UR QL STRIP.AUTO: NORMAL
PH UR STRIP.AUTO: 6.5 [PH] (ref 5–8)
PROT UR QL STRIP: NORMAL MG/DL
RBC UR QL AUTO: NORMAL
SP GR UR STRIP.AUTO: 1.01
UROBILINOGEN UR STRIP-MCNC: 0.2 MG/DL

## 2020-12-16 PROCEDURE — 81002 URINALYSIS NONAUTO W/O SCOPE: CPT | Performed by: NURSE PRACTITIONER

## 2020-12-16 PROCEDURE — 83036 HEMOGLOBIN GLYCOSYLATED A1C: CPT | Performed by: NURSE PRACTITIONER

## 2020-12-16 PROCEDURE — 99214 OFFICE O/P EST MOD 30 MIN: CPT | Performed by: NURSE PRACTITIONER

## 2020-12-16 RX ORDER — NITROFURANTOIN 25; 75 MG/1; MG/1
100 CAPSULE ORAL 2 TIMES DAILY
Qty: 10 CAP | Refills: 0 | Status: SHIPPED | OUTPATIENT
Start: 2020-12-16 | End: 2020-12-21

## 2020-12-16 ASSESSMENT — FIBROSIS 4 INDEX: FIB4 SCORE: 1.41

## 2020-12-16 NOTE — PROGRESS NOTES
"    HPI  Brianne is a 76 yo est female here to follow-up on a couple of issues:    #1- DM type II:  Chronic issue.  Has been eating more and less active.  Taking metformin 500 mg daily since 2019.  Not checking home blood sugars.  Denies numbness or pain in her feet such as burning.  Denies vision changes.  Component      Latest Ref Rng & Units 3/4/2019 12/10/2019 6/2/2020          12:00 AM  2:55 PM 11:45 AM   Glycohemoglobin      0.0 - 5.6 % 7.1 (A) 6.7 (A) 6.9 (H)   Internal Control Negative        Negative    Internal Control Positive        Positive    Estim. Avg Glu      mg/dL   151     #2- possible UTI:  New onset dysuria / stinging with urination for the past several days and mentions chronic urinary frequency.  Given oxybutynin which has helped by 80% in terms of improving her urinary frequency.  Ironically oxybutynin has also stopped her diarrhea that has been present x 25 yrs.  Now also followed by GI.  Denies fevers, chills or abdominal pain.    #3-lower extremity edema: Chronic issue.  Taking triamterene/HCTZ.  Wondering if she should try compression stockings.  Very physically inactive.  Sits majority of her day.  Denies shortness of breath.  Echocardiogram showed a normal ejection fraction 1 year ago.    Past medical, surgical, family, and social history is reviewed and updated in Epic chart by me today.   Medications and allergies reviewed and updated in Epic chart by me today.     ROS:   As documented in history of present illness above    Exam:  /80 (BP Location: Left arm, Patient Position: Sitting, BP Cuff Size: Large adult)   Pulse 70   Temp 36.5 °C (97.7 °F)   Resp 16   Ht 1.6 m (5' 3\")   Wt 107.5 kg (237 lb)   SpO2 96%     Constitutional: Overweight female, alert, no distress, plus 3 vital signs  Skin:  Warm, dry, no rashes invisible areas  Eye: Equal, round and reactive, conjunctiva clear  ENMT: Lips without lesions, good dentition, oropharynx clear    Neck: Trachea midline, no " masses, no thyromegaly  Respiratory: Unlabored respiration, lungs clear to auscultation, no wheezes, no rhonchi  Cardiovascular: Normal rate and rhythm.  She does have +1 pitting edema to bilateral lower extremities around the ankle, stating that her ankles have looked like this for several decades.  No weeping.  Slight hyperpigmentation.  Psych: Alert, pleasant, well-groomed, normal affect    Assessment / Plan / Medical Decision makin. Controlled type 2 diabetes mellitus without complication, without long-term current use of insulin ([facility])  -A1c is at 7.3.  Discussed getting her A1c below 7 by following a more strict diabetic diet and increasing her exercise.  Recommend recheck in 6 months.  Discussed proper foot care, seeing her eye doctor yearly and staying well-hydrated with water.  - POCT  A1C  - Comp Metabolic Panel; Future  - CBC WITH DIFFERENTIAL; Future  - HEMOGLOBIN A1C; Future  - MICROALBUMIN CREAT RATIO URINE; Future    2. Bilateral lower extremity edema  -Offered to change her triamterene/HCTZ to Lasix with potassium which she was hesitant about.  She would prefer to try compression stockings for a few months.  We discussed propping her feet when seated, increasing her exercise, lowering her salt, elevating her feet when seated and losing some weight.  She will follow-up with me in 3 to 6 months, sooner with any worsening, weeping or open wounds.    3. Acute UTI  -Urinalysis positive for leukocytes and blood.  Start Macrobid twice daily for 5 days and contact me if symptoms have not completely resolved.  Encouraged her to avoid sugary beverages, drink plenty of water and urinate frequently.  Discussed ER precautions with temperatures greater than 104 or acutely worsening symptoms involving confusion or abdominal pain.  - nitrofurantoin (MACROBID) 100 MG Cap; Take 1 Cap by mouth 2 times a day for 5 days.  Dispense: 10 Cap; Refill: 0  - POCT Urinalysis    4. Hyperlipidemia, unspecified hyperlipidemia  type  -Recommend updated lipid panel prior to her next visit.  - Lipid Profile; Future

## 2021-01-11 DIAGNOSIS — Z23 NEED FOR VACCINATION: ICD-10-CM

## 2021-04-03 ENCOUNTER — IMMUNIZATION (OUTPATIENT)
Dept: FAMILY PLANNING/WOMEN'S HEALTH CLINIC | Facility: IMMUNIZATION CENTER | Age: 76
End: 2021-04-03
Attending: INTERNAL MEDICINE
Payer: MEDICARE

## 2021-04-03 DIAGNOSIS — Z23 NEED FOR VACCINATION: ICD-10-CM

## 2021-04-03 DIAGNOSIS — Z23 ENCOUNTER FOR VACCINATION: Primary | ICD-10-CM

## 2021-04-03 PROCEDURE — 0001A PFIZER SARS-COV-2 VACCINE: CPT

## 2021-04-03 PROCEDURE — 91300 PFIZER SARS-COV-2 VACCINE: CPT

## 2021-04-23 ENCOUNTER — IMMUNIZATION (OUTPATIENT)
Dept: FAMILY PLANNING/WOMEN'S HEALTH CLINIC | Facility: IMMUNIZATION CENTER | Age: 76
End: 2021-04-23
Payer: MEDICARE

## 2021-04-23 DIAGNOSIS — Z23 ENCOUNTER FOR VACCINATION: Primary | ICD-10-CM

## 2021-04-23 PROCEDURE — 0002A PFIZER SARS-COV-2 VACCINE: CPT

## 2021-04-23 PROCEDURE — 91300 PFIZER SARS-COV-2 VACCINE: CPT

## 2021-06-07 RX ORDER — MELOXICAM 15 MG/1
15 TABLET ORAL DAILY
Qty: 30 TABLET | Refills: 4 | Status: SHIPPED | OUTPATIENT
Start: 2021-06-07 | End: 2022-01-19

## 2021-06-16 ENCOUNTER — HOSPITAL ENCOUNTER (OUTPATIENT)
Dept: LAB | Facility: MEDICAL CENTER | Age: 76
End: 2021-06-16
Attending: NURSE PRACTITIONER
Payer: MEDICARE

## 2021-06-16 DIAGNOSIS — E11.9 CONTROLLED TYPE 2 DIABETES MELLITUS WITHOUT COMPLICATION, WITHOUT LONG-TERM CURRENT USE OF INSULIN (HCC): ICD-10-CM

## 2021-06-16 DIAGNOSIS — E78.5 HYPERLIPIDEMIA, UNSPECIFIED HYPERLIPIDEMIA TYPE: ICD-10-CM

## 2021-06-16 LAB
ALBUMIN SERPL BCP-MCNC: 4 G/DL (ref 3.2–4.9)
ALBUMIN/GLOB SERPL: 1.4 G/DL
ALP SERPL-CCNC: 111 U/L (ref 30–99)
ALT SERPL-CCNC: 10 U/L (ref 2–50)
ANION GAP SERPL CALC-SCNC: 9 MMOL/L (ref 7–16)
AST SERPL-CCNC: 13 U/L (ref 12–45)
BASOPHILS # BLD AUTO: 1 % (ref 0–1.8)
BASOPHILS # BLD: 0.08 K/UL (ref 0–0.12)
BILIRUB SERPL-MCNC: 0.4 MG/DL (ref 0.1–1.5)
BUN SERPL-MCNC: 25 MG/DL (ref 8–22)
CALCIUM SERPL-MCNC: 10.1 MG/DL (ref 8.5–10.5)
CHLORIDE SERPL-SCNC: 105 MMOL/L (ref 96–112)
CHOLEST SERPL-MCNC: 155 MG/DL (ref 100–199)
CO2 SERPL-SCNC: 26 MMOL/L (ref 20–33)
CREAT SERPL-MCNC: 0.92 MG/DL (ref 0.5–1.4)
CREAT UR-MCNC: 66.71 MG/DL
EOSINOPHIL # BLD AUTO: 0.24 K/UL (ref 0–0.51)
EOSINOPHIL NFR BLD: 3 % (ref 0–6.9)
ERYTHROCYTE [DISTWIDTH] IN BLOOD BY AUTOMATED COUNT: 45.3 FL (ref 35.9–50)
EST. AVERAGE GLUCOSE BLD GHB EST-MCNC: 171 MG/DL
FASTING STATUS PATIENT QL REPORTED: NORMAL
GLOBULIN SER CALC-MCNC: 2.9 G/DL (ref 1.9–3.5)
GLUCOSE SERPL-MCNC: 174 MG/DL (ref 65–99)
HBA1C MFR BLD: 7.6 % (ref 4–5.6)
HCT VFR BLD AUTO: 42.8 % (ref 37–47)
HDLC SERPL-MCNC: 36 MG/DL
HGB BLD-MCNC: 13.9 G/DL (ref 12–16)
IMM GRANULOCYTES # BLD AUTO: 0.04 K/UL (ref 0–0.11)
IMM GRANULOCYTES NFR BLD AUTO: 0.5 % (ref 0–0.9)
LDLC SERPL CALC-MCNC: 91 MG/DL
LYMPHOCYTES # BLD AUTO: 2.1 K/UL (ref 1–4.8)
LYMPHOCYTES NFR BLD: 26 % (ref 22–41)
MCH RBC QN AUTO: 30.4 PG (ref 27–33)
MCHC RBC AUTO-ENTMCNC: 32.5 G/DL (ref 33.6–35)
MCV RBC AUTO: 93.7 FL (ref 81.4–97.8)
MICROALBUMIN UR-MCNC: 1.3 MG/DL
MICROALBUMIN/CREAT UR: 19 MG/G (ref 0–30)
MONOCYTES # BLD AUTO: 0.69 K/UL (ref 0–0.85)
MONOCYTES NFR BLD AUTO: 8.6 % (ref 0–13.4)
NEUTROPHILS # BLD AUTO: 4.92 K/UL (ref 2–7.15)
NEUTROPHILS NFR BLD: 60.9 % (ref 44–72)
NRBC # BLD AUTO: 0 K/UL
NRBC BLD-RTO: 0 /100 WBC
PLATELET # BLD AUTO: 204 K/UL (ref 164–446)
PMV BLD AUTO: 11.2 FL (ref 9–12.9)
POTASSIUM SERPL-SCNC: 4.7 MMOL/L (ref 3.6–5.5)
PROT SERPL-MCNC: 6.9 G/DL (ref 6–8.2)
RBC # BLD AUTO: 4.57 M/UL (ref 4.2–5.4)
SODIUM SERPL-SCNC: 140 MMOL/L (ref 135–145)
TRIGL SERPL-MCNC: 141 MG/DL (ref 0–149)
WBC # BLD AUTO: 8.1 K/UL (ref 4.8–10.8)

## 2021-06-16 PROCEDURE — 82570 ASSAY OF URINE CREATININE: CPT

## 2021-06-16 PROCEDURE — 80061 LIPID PANEL: CPT

## 2021-06-16 PROCEDURE — 82043 UR ALBUMIN QUANTITATIVE: CPT

## 2021-06-16 PROCEDURE — 83036 HEMOGLOBIN GLYCOSYLATED A1C: CPT | Mod: GA

## 2021-06-16 PROCEDURE — 85025 COMPLETE CBC W/AUTO DIFF WBC: CPT

## 2021-06-16 PROCEDURE — 80053 COMPREHEN METABOLIC PANEL: CPT

## 2021-06-16 PROCEDURE — 36415 COLL VENOUS BLD VENIPUNCTURE: CPT

## 2021-06-22 ENCOUNTER — OFFICE VISIT (OUTPATIENT)
Dept: MEDICAL GROUP | Facility: LAB | Age: 76
End: 2021-06-22
Payer: MEDICARE

## 2021-06-22 VITALS
HEIGHT: 63 IN | HEART RATE: 78 BPM | TEMPERATURE: 97.8 F | OXYGEN SATURATION: 94 % | RESPIRATION RATE: 16 BRPM | WEIGHT: 243 LBS | DIASTOLIC BLOOD PRESSURE: 78 MMHG | BODY MASS INDEX: 43.05 KG/M2 | SYSTOLIC BLOOD PRESSURE: 138 MMHG

## 2021-06-22 DIAGNOSIS — E66.01 CLASS 3 SEVERE OBESITY WITH SERIOUS COMORBIDITY AND BODY MASS INDEX (BMI) OF 40.0 TO 44.9 IN ADULT, UNSPECIFIED OBESITY TYPE (HCC): ICD-10-CM

## 2021-06-22 DIAGNOSIS — E11.9 CONTROLLED TYPE 2 DIABETES MELLITUS WITHOUT COMPLICATION, WITHOUT LONG-TERM CURRENT USE OF INSULIN (HCC): ICD-10-CM

## 2021-06-22 DIAGNOSIS — R60.0 BILATERAL LOWER EXTREMITY EDEMA: ICD-10-CM

## 2021-06-22 DIAGNOSIS — M51.36 DDD (DEGENERATIVE DISC DISEASE), LUMBAR: ICD-10-CM

## 2021-06-22 DIAGNOSIS — G47.9 SLEEP DISTURBANCE: ICD-10-CM

## 2021-06-22 PROBLEM — M51.369 DDD (DEGENERATIVE DISC DISEASE), LUMBAR: Status: ACTIVE | Noted: 2021-06-22

## 2021-06-22 PROBLEM — E66.813 CLASS 3 SEVERE OBESITY IN ADULT (HCC): Status: ACTIVE | Noted: 2021-06-22

## 2021-06-22 PROCEDURE — 99214 OFFICE O/P EST MOD 30 MIN: CPT | Performed by: NURSE PRACTITIONER

## 2021-06-22 RX ORDER — OXYBUTYNIN CHLORIDE 10 MG/1
TABLET, EXTENDED RELEASE ORAL
COMMUNITY
Start: 2021-05-25 | End: 2021-11-01 | Stop reason: SDUPTHER

## 2021-06-22 RX ORDER — ZOLPIDEM TARTRATE 10 MG/1
10 TABLET ORAL NIGHTLY PRN
Qty: 30 TABLET | Refills: 0 | Status: SHIPPED | OUTPATIENT
Start: 2021-06-22 | End: 2021-08-30

## 2021-06-22 ASSESSMENT — PATIENT HEALTH QUESTIONNAIRE - PHQ9: CLINICAL INTERPRETATION OF PHQ2 SCORE: 0

## 2021-06-22 ASSESSMENT — FIBROSIS 4 INDEX: FIB4 SCORE: 1.55

## 2021-06-22 NOTE — ASSESSMENT & PLAN NOTE
Chronic issue.  a1c has risen which makes sense to her with less activity r/t chronic / worsening knee pain.  Taking metformin 500 mg once daily.  No longer having diarrhea.  Weight has increased by 6 lbs in the past 6 months.  Denies numbness / tingling in feet.  Denies dysuria.  Eye doc has told her that she does not have retinopathy.

## 2021-06-22 NOTE — PROGRESS NOTES
"Chief Complaint   Patient presents with   • Diabetes Follow-up       HPI   Brianne is a 75-year-old established female here to follow-up on lab work as well as some other chronic issues.  Requesting Ambien for travel, has periodic trouble falling and staying asleep and likes to use this about once a month when she travels to [place] to get her haircut.  Ambien has worked very well for her in the past when she was still working.  She tries to refrain from taking Ambien nightly now that she is no longer working.    Controlled type 2 diabetes mellitus without complication, without long-term current use of insulin (HCC)  Chronic issue.  a1c has risen which makes sense to her with less activity r/t chronic / worsening knee pain.  Taking metformin 500 mg once daily.  No longer having diarrhea.  Weight has increased by 6 lbs in the past 6 months.  Denies numbness / tingling in feet.  Denies dysuria.  Eye doc has told her that she does not have retinopathy.      DDD (degenerative disc disease), lumbar  Chronic issue.  Saw [facility] physiatry for this and was placed on meloxicam.  Declined injections / PT.  Pain is persistent but not worsening.      Past medical, surgical, family, and social history is reviewed and updated in Epic chart by me today.   Medications and allergies reviewed and updated in Epic chart by me today.     ROS:   As documented in history of present illness above    Exam:  /78 (BP Location: Left arm, Patient Position: Sitting, BP Cuff Size: Large adult)   Pulse 78   Temp 36.6 °C (97.8 °F)   Resp 16   Ht 1.6 m (5' 3\")   Wt 110 kg (243 lb)   SpO2 94%   Constitutional: Alert, no distress, plus 3 vital signs  Skin:  Warm, dry, no rashes invisible areas  ENMT: Lips without lesions  Respiratory: Unlabored respiration, lungs clear to auscultation, no wheezes, no rhonchi  Cardiovascular: Normal rate and rhythm.  + chronic edema without wounds / wheeping.   Psych: Alert, pleasant, well-groomed, normal " affect  Monofilament testing with a 10 gram force: sensation intact: intact bilaterally  Visual Inspection: Feet without maceration, ulcers, fissures.  Pedal pulses: intact bilaterally      Assessment / Plan / Medical Decision makin. Controlled type 2 diabetes mellitus without complication, without long-term current use of insulin ([facility])  Basic Metabolic Panel    HEMOGLOBIN A1C   2. DDD (degenerative disc disease), lumbar     3. Class 3 severe obesity with serious comorbidity and body mass index (BMI) of 40.0 to 44.9 in adult, unspecified obesity type ([facility])     4. Sleep disturbance  zolpidem (AMBIEN) 10 MG Tab   5. Bilateral lower extremity edema       Unfortunately A1c has climbed just a bit and her average blood sugar is up to 170.  I increased Metformin to 1000 mg daily and she will email me if this causes a return of diarrhea.  If she is intolerant to a higher dose of Metformin, I will add on 1 mg of glimepiride.  We discussed a diabetic diet and increasing her exercise as well as working on her weight.  Discussed a low sodium diet, high water intake and avoidance of any NSAIDs beyond meloxicam.  Discussed new onset mild microalbuminuria and elevated kidney function testing, she will work on this through previous recommendations and recheck in just 3 months.    I did send a prescription of #30 Ambien to her local pharmacy.  Her  profile was completely empty of any controlled substance fills.  Discussed potential memory loss with chronic use of Ambien.  I reminded her of fall precautions with use of Ambien.

## 2021-06-22 NOTE — ASSESSMENT & PLAN NOTE
Chronic issue.  Saw [facility] physiatry for this and was placed on meloxicam.  Declined injections / PT.  Pain is persistent but not worsening.

## 2021-07-14 RX ORDER — TRIAMTERENE AND HYDROCHLOROTHIAZIDE 37.5; 25 MG/1; MG/1
TABLET ORAL
Qty: 90 TABLET | Refills: 3 | Status: SHIPPED | OUTPATIENT
Start: 2021-07-14 | End: 2022-05-26

## 2021-07-14 RX ORDER — BENAZEPRIL HYDROCHLORIDE 10 MG/1
TABLET ORAL
Qty: 90 TABLET | Refills: 3 | Status: SHIPPED | OUTPATIENT
Start: 2021-07-14 | End: 2022-05-26

## 2021-07-14 NOTE — TELEPHONE ENCOUNTER
Received request via: Pharmacy    Was the patient seen in the last year in this department? Yes  6/22/21  Does the patient have an active prescription (recently filled or refills available) for medication(s) requested? No

## 2021-08-26 RX ORDER — SIMVASTATIN 40 MG
TABLET ORAL
Qty: 90 TABLET | Refills: 3 | Status: SHIPPED | OUTPATIENT
Start: 2021-08-26 | End: 2022-07-05

## 2021-09-03 ENCOUNTER — TELEPHONE (OUTPATIENT)
Dept: MEDICAL GROUP | Facility: LAB | Age: 76
End: 2021-09-03

## 2021-09-03 DIAGNOSIS — D22.9 NUMEROUS SKIN MOLES: ICD-10-CM

## 2021-09-03 DIAGNOSIS — M21.70: ICD-10-CM

## 2021-09-03 NOTE — TELEPHONE ENCOUNTER
Referrals pending    ----- Message from Brianne Wilson sent at 9/2/2021 10:08 PM PDT -----  Regarding: Referral  Hi Dr. Guillen,  Can you refer me to a podiatrist and a  dermatologist?    Thank you,    Brianne Wilson

## 2021-09-07 NOTE — TELEPHONE ENCOUNTER
podiatry is for getting a shoe insert after knee surgery left my leg shorter. Surgeron advised to get one to make it level again.  No, the dermatology is for a wart that is growing by my nose, and some moles on my chest that seem to be getter larger.

## 2021-09-22 ENCOUNTER — HOSPITAL ENCOUNTER (OUTPATIENT)
Dept: LAB | Facility: MEDICAL CENTER | Age: 76
End: 2021-09-22
Attending: NURSE PRACTITIONER
Payer: MEDICARE

## 2021-09-22 DIAGNOSIS — E11.9 CONTROLLED TYPE 2 DIABETES MELLITUS WITHOUT COMPLICATION, WITHOUT LONG-TERM CURRENT USE OF INSULIN (HCC): ICD-10-CM

## 2021-09-22 LAB
ANION GAP SERPL CALC-SCNC: 12 MMOL/L (ref 7–16)
BUN SERPL-MCNC: 17 MG/DL (ref 8–22)
CALCIUM SERPL-MCNC: 10.5 MG/DL (ref 8.5–10.5)
CHLORIDE SERPL-SCNC: 104 MMOL/L (ref 96–112)
CO2 SERPL-SCNC: 24 MMOL/L (ref 20–33)
CREAT SERPL-MCNC: 0.84 MG/DL (ref 0.5–1.4)
EST. AVERAGE GLUCOSE BLD GHB EST-MCNC: 169 MG/DL
GLUCOSE SERPL-MCNC: 195 MG/DL (ref 65–99)
HBA1C MFR BLD: 7.5 % (ref 4–5.6)
POTASSIUM SERPL-SCNC: 4 MMOL/L (ref 3.6–5.5)
SODIUM SERPL-SCNC: 140 MMOL/L (ref 135–145)

## 2021-09-22 PROCEDURE — 36415 COLL VENOUS BLD VENIPUNCTURE: CPT | Mod: GA

## 2021-09-22 PROCEDURE — 83036 HEMOGLOBIN GLYCOSYLATED A1C: CPT | Mod: GA

## 2021-09-22 PROCEDURE — 80048 BASIC METABOLIC PNL TOTAL CA: CPT

## 2021-09-27 ENCOUNTER — OFFICE VISIT (OUTPATIENT)
Dept: MEDICAL GROUP | Facility: LAB | Age: 76
End: 2021-09-27
Payer: MEDICARE

## 2021-09-27 VITALS
SYSTOLIC BLOOD PRESSURE: 122 MMHG | HEIGHT: 63 IN | HEART RATE: 80 BPM | RESPIRATION RATE: 16 BRPM | WEIGHT: 233 LBS | BODY MASS INDEX: 41.29 KG/M2 | OXYGEN SATURATION: 95 % | DIASTOLIC BLOOD PRESSURE: 80 MMHG | TEMPERATURE: 96.4 F

## 2021-09-27 DIAGNOSIS — I10 ESSENTIAL HYPERTENSION: ICD-10-CM

## 2021-09-27 DIAGNOSIS — E66.01 CLASS 3 SEVERE OBESITY WITH SERIOUS COMORBIDITY AND BODY MASS INDEX (BMI) OF 40.0 TO 44.9 IN ADULT, UNSPECIFIED OBESITY TYPE (HCC): ICD-10-CM

## 2021-09-27 DIAGNOSIS — E11.9 CONTROLLED TYPE 2 DIABETES MELLITUS WITHOUT COMPLICATION, WITHOUT LONG-TERM CURRENT USE OF INSULIN (HCC): ICD-10-CM

## 2021-09-27 PROCEDURE — 99214 OFFICE O/P EST MOD 30 MIN: CPT | Performed by: NURSE PRACTITIONER

## 2021-09-27 ASSESSMENT — FIBROSIS 4 INDEX: FIB4 SCORE: 1.55

## 2021-09-27 NOTE — PROGRESS NOTES
"Chief Complaint   Patient presents with   • Lab Results       HPI   Brianne is a 75-year-old established female here to follow-up on recent labs.  Overall feeling well.  Started a new diet and is more physically active.  Has lost some weight.    #1-controlled type 2 diabetes mellitus without complication, without long-term current use of insulin (HCC)  Chronic issue. A1c jumped up to 7.6 last June and is now down to 7.5.   Was rx 1000 mg Metformin 3 mo ago but this caused diarrhea so she is only on 500 mg daily.  Now following a low carb / diabetic diet to include fruits / vegetables / meat -based on a new book that she is reading.  Trying to limit carbohydrates to less than 75 g.  Avoiding sugar through sweets.  Has lost 10 lbs and feels better.  Exercise is circles around her home.     #2-early chronic kidney disease: I was concerned that BUN was up to 25 with creatinine 0.92 and GFR of 59 at our visit in June. She has increased her water intake and now BUN/creatinine/GFR is down to 17/0.84/>60.    #3-hypertension: Chronic issue.  Denies chest pain or trouble breathing.  Compliant with benazepril 10 mg daily as well as triamterene/HCTZ.  Trying to follow a low-salt diet.  Has lost weight as above.      Past medical, surgical, family, and social history is reviewed and updated in Epic chart by me today.   Medications and allergies reviewed and updated in Epic chart by me today.     ROS:   As documented in history of present illness above    Exam:  /80 (BP Location: Left arm, Patient Position: Sitting, BP Cuff Size: Adult long)   Pulse 80   Temp (!) 35.8 °C (96.4 °F)   Resp 16   Ht 1.6 m (5' 3\")   Wt 106 kg (233 lb)   SpO2 95%   Constitutional: Alert, no distress, plus 3 vital signs  Skin:  Warm, dry, no rashes invisible areas  Eye: Equal, round and reactive, conjunctiva clear  ENMT: Lips without lesions.  Respiratory: Unlabored respiration.  Cardiovascular: Normal rate and rhythm.  Psych: Alert, " "pleasant, well-groomed, normal affect    Assessment / Plan / Medical Decision making:   \"  1. Controlled type 2 diabetes mellitus without complication, without long-term current use of insulin (HCC)     2. Class 3 severe obesity with serious comorbidity and body mass index (BMI) of 40.0 to 44.9 in adult, unspecified obesity type (HCC)     3. Essential hypertension     \"  All conditions improving.  Excellent that she has lost weight and changed her diet.  A1c down slightly.  Recommend recheck A1c in 5 to 6 months, she will follow-up in late February.  She is willing to continue on Metformin until her A1c drops below 6.5 and that she would like to try to come off of it.  We discussed the importance of a strict diabetic diet, low carbohydrate intake, exercise and continued weight loss.  Blood pressure very well controlled.  Discussed the importance of a low-sodium diet and maintaining adequate hydration.    Declined influenza vaccine.  No family history of breast cancer, she would like to move mammograms every 2 years.  She is vaccinated for COVID.  "

## 2021-09-27 NOTE — ASSESSMENT & PLAN NOTE
Chronic issue. A1c jumped up to 7.5 last June and is now down to 7.5. She is newly 1000 mg of Metformin daily x 3 mo, increased at [facility] visit.

## 2021-11-01 ENCOUNTER — PATIENT MESSAGE (OUTPATIENT)
Dept: MEDICAL GROUP | Facility: LAB | Age: 76
End: 2021-11-01

## 2021-11-02 RX ORDER — OXYBUTYNIN CHLORIDE 10 MG/1
10 TABLET, EXTENDED RELEASE ORAL DAILY
Qty: 30 TABLET | Refills: 3 | Status: SHIPPED | OUTPATIENT
Start: 2021-11-02 | End: 2022-03-02 | Stop reason: SDUPTHER

## 2021-11-02 NOTE — TELEPHONE ENCOUNTER
----- Message from Brianne Wilson sent at 11/1/2021  4:00 PM PDT -----  Regarding: Prescription  Hi Dr. Guillen,  I have a prescription for bladder control that I originally got from Dr. Jackson for OXBUTYNIN ER 10MG tab. I was wondering if you could refill this? I would prefer to use you and not [facility].   I have No refills, and 3 pills left.  Thank you,    Brianne Wilson

## 2021-11-20 ENCOUNTER — OFFICE VISIT (OUTPATIENT)
Dept: URGENT CARE | Facility: PHYSICIAN GROUP | Age: 76
End: 2021-11-20
Payer: MEDICARE

## 2021-11-20 VITALS
SYSTOLIC BLOOD PRESSURE: 124 MMHG | HEIGHT: 63 IN | DIASTOLIC BLOOD PRESSURE: 68 MMHG | OXYGEN SATURATION: 99 % | WEIGHT: 228 LBS | TEMPERATURE: 96.5 F | BODY MASS INDEX: 40.4 KG/M2 | HEART RATE: 80 BPM

## 2021-11-20 DIAGNOSIS — R19.7 DIARRHEA, UNSPECIFIED TYPE: ICD-10-CM

## 2021-11-20 PROCEDURE — 99213 OFFICE O/P EST LOW 20 MIN: CPT | Performed by: NURSE PRACTITIONER

## 2021-11-20 ASSESSMENT — ENCOUNTER SYMPTOMS
VOMITING: 0
NAUSEA: 0
CHILLS: 0
ABDOMINAL PAIN: 0
DIARRHEA: 1
FEVER: 0

## 2021-11-20 ASSESSMENT — FIBROSIS 4 INDEX: FIB4 SCORE: 1.57

## 2021-11-21 NOTE — PROGRESS NOTES
"Subjective     Brianne Wilson is a 76 y.o. female who presents with Diarrhea (yellow started Wed. night)    Past Medical History:   Diagnosis Date   • Cataract     no treatment-left eye   • Elevated blood sugar     States borderline diabetes for 20 years. Never any meds   • High cholesterol    • Hypertension    • Right knee pain 12/21/2018   • Urinary bladder disorder     \"lazy bladder\"   • Urinary incontinence     due to \"lazy bladder\" has no warning when need to urinate.      Social History     Socioeconomic History   • Marital status:      Spouse name: Not on file   • Number of children: Not on file   • Years of education: Not on file   • Highest education level: Not on file   Occupational History   • Not on file   Tobacco Use   • Smoking status: Never Smoker   • Smokeless tobacco: Never Used   Vaping Use   • Vaping Use: Never used   Substance and Sexual Activity   • Alcohol use: Yes     Comment: 1 per month   • Drug use: Never   • Sexual activity: Not on file     Comment:  since 1990 -  25 yrs; two kids (boys); grandchildren (3 girls); floral design / interior design   Other Topics Concern   • Not on file   Social History Narrative   • Not on file     Social Determinants of Health     Financial Resource Strain:    • Difficulty of Paying Living Expenses: Not on file   Food Insecurity:    • Worried About Running Out of Food in the Last Year: Not on file   • Ran Out of Food in the Last Year: Not on file   Transportation Needs:    • Lack of Transportation (Medical): Not on file   • Lack of Transportation (Non-Medical): Not on file   Physical Activity:    • Days of Exercise per Week: Not on file   • Minutes of Exercise per Session: Not on file   Stress:    • Feeling of Stress : Not on file   Social Connections:    • Frequency of Communication with Friends and Family: Not on file   • Frequency of Social Gatherings with Friends and Family: Not on file   • Attends Restorationism Services: Not on " file   • Active Member of Clubs or Organizations: Not on file   • Attends Club or Organization Meetings: Not on file   • Marital Status: Not on file   Intimate Partner Violence:    • Fear of Current or Ex-Partner: Not on file   • Emotionally Abused: Not on file   • Physically Abused: Not on file   • Sexually Abused: Not on file   Housing Stability:    • Unable to Pay for Housing in the Last Year: Not on file   • Number of Places Lived in the Last Year: Not on file   • Unstable Housing in the Last Year: Not on file     Family History   Problem Relation Age of Onset   • Other Father         MVA / head injury / blood clot to heart   • Hypertension Sister        Allrgies: Patient has no known allerbies    Patient is a 76-year-old female who presents today with complaint of diarrhea for 3 days.  Reports approximately 5 episodes per day.  No nausea or vomiting.  No abdominal pain or cramping.  She has stated a tendency for chronic diarrhea but states this has been a little different.  States initially that she did have some body aches and chills.  No recent foreign travel.  States she did travel to [place] but that was almost 1 month ago.  No ill exposures that she knows of.  States her dog did have some diarrhea at home and she wonders if she may have contracted this from the dog.        Diarrhea   This is a new problem. Episode onset: 3 days ago. The problem has been unchanged. The stool consistency is described as watery. Pertinent negatives include no abdominal pain, chills, fever or vomiting. Nothing aggravates the symptoms. She has tried nothing for the symptoms. The treatment provided no relief.       Review of Systems   Constitutional: Positive for malaise/fatigue. Negative for chills and fever.   Gastrointestinal: Positive for diarrhea. Negative for abdominal pain, nausea and vomiting.   Skin: Negative.    All other systems reviewed and are negative.             Objective     /68 (BP Location: Left arm,  "Patient Position: Sitting, BP Cuff Size: Large adult)   Pulse 80   Temp 35.8 °C (96.5 °F) (Temporal)   Ht 1.6 m (5' 2.99\")   Wt 103 kg (228 lb)   SpO2 99%   BMI 40.40 kg/m²      Physical Exam  Vitals reviewed.   Constitutional:       Appearance: Normal appearance.   Eyes:      Extraocular Movements: Extraocular movements intact.      Conjunctiva/sclera: Conjunctivae normal.      Pupils: Pupils are equal, round, and reactive to light.   Cardiovascular:      Rate and Rhythm: Normal rate and regular rhythm.      Heart sounds: Normal heart sounds.   Pulmonary:      Effort: Pulmonary effort is normal. No respiratory distress.      Breath sounds: Normal breath sounds. No stridor. No wheezing, rhonchi or rales.   Chest:      Chest wall: No tenderness.   Musculoskeletal:         General: Normal range of motion.   Skin:     General: Skin is warm and dry.      Capillary Refill: Capillary refill takes less than 2 seconds.   Neurological:      Mental Status: She is alert and oriented to person, place, and time.   Psychiatric:         Mood and Affect: Mood normal.         Behavior: Behavior normal.                             Assessment & Plan   Gastroenteritis    Refused Covid test  Orders given for stool culture  Imodium as needed  Brat diet  ER precautions for intractable diarrhea or developing abdominal pain or vomiting     There are no diagnoses linked to this encounter.              "

## 2021-12-27 DIAGNOSIS — G47.9 SLEEP DISTURBANCE: ICD-10-CM

## 2021-12-28 RX ORDER — ZOLPIDEM TARTRATE 10 MG/1
TABLET ORAL
Qty: 30 TABLET | Refills: 1 | Status: SHIPPED | OUTPATIENT
Start: 2021-12-28 | End: 2022-01-27

## 2022-01-19 RX ORDER — MELOXICAM 15 MG/1
TABLET ORAL
Qty: 30 TABLET | Refills: 4 | Status: SHIPPED | OUTPATIENT
Start: 2022-01-19 | End: 2022-03-02 | Stop reason: SDUPTHER

## 2022-01-19 NOTE — TELEPHONE ENCOUNTER
Received request via: Pharmacy    Was the patient seen in the last year in this department? Yes  9/27/2021  Does the patient have an active prescription (recently filled or refills available) for medication(s) requested? No

## 2022-02-24 ENCOUNTER — OFFICE VISIT (OUTPATIENT)
Dept: MEDICAL GROUP | Facility: LAB | Age: 77
End: 2022-02-24
Payer: MEDICARE

## 2022-02-24 VITALS
OXYGEN SATURATION: 96 % | HEIGHT: 63 IN | SYSTOLIC BLOOD PRESSURE: 132 MMHG | BODY MASS INDEX: 40.22 KG/M2 | DIASTOLIC BLOOD PRESSURE: 78 MMHG | WEIGHT: 227 LBS | RESPIRATION RATE: 16 BRPM | TEMPERATURE: 96.8 F | HEART RATE: 74 BPM

## 2022-02-24 DIAGNOSIS — E11.65 UNCONTROLLED TYPE 2 DIABETES MELLITUS WITH HYPERGLYCEMIA (HCC): ICD-10-CM

## 2022-02-24 LAB
HBA1C MFR BLD: 11.8 % (ref 0–5.6)
INT CON NEG: NEGATIVE
INT CON POS: POSITIVE

## 2022-02-24 PROCEDURE — 83036 HEMOGLOBIN GLYCOSYLATED A1C: CPT | Performed by: NURSE PRACTITIONER

## 2022-02-24 PROCEDURE — 99214 OFFICE O/P EST MOD 30 MIN: CPT | Performed by: NURSE PRACTITIONER

## 2022-02-24 RX ORDER — EMPAGLIFLOZIN 25 MG/1
1 TABLET, FILM COATED ORAL DAILY
Qty: 30 TABLET | Refills: 5 | Status: SHIPPED
Start: 2022-02-24 | End: 2022-02-27

## 2022-02-24 RX ORDER — GLIMEPIRIDE 2 MG/1
2 TABLET ORAL EVERY MORNING
Qty: 30 TABLET | Refills: 5 | Status: SHIPPED | OUTPATIENT
Start: 2022-02-24 | End: 2022-03-02 | Stop reason: SDUPTHER

## 2022-02-24 RX ORDER — LANCETS 30 GAUGE
EACH MISCELLANEOUS
Qty: 100 EACH | Refills: 3 | Status: SHIPPED | OUTPATIENT
Start: 2022-02-24 | End: 2022-02-25 | Stop reason: SDUPTHER

## 2022-02-24 ASSESSMENT — PATIENT HEALTH QUESTIONNAIRE - PHQ9: CLINICAL INTERPRETATION OF PHQ2 SCORE: 0

## 2022-02-24 ASSESSMENT — FIBROSIS 4 INDEX: FIB4 SCORE: 1.57

## 2022-02-24 NOTE — ASSESSMENT & PLAN NOTE
Chronic issue.  Last A1c was 7.5 about 5 months ago.  Treated with metformin once daily.  Exercise:  Foot problems:  Eye exam:

## 2022-02-24 NOTE — PROGRESS NOTES
"CC  f/u      HPI:  Brianne is a 76-year-old established female here to follow-up on type 2 diabetes.    #1- type 2 diabetes mellitus without complication, without long-term current use of insulin (HCC)  Chronic issue.  Now out of control.  Last A1c was 7.5 about 5 months ago.  Treated with metformin once daily but she has not been taking this for many months b/c of diarrhea.   Bk: not eating this unless it's at 1 pm.  Protein shake, cookies, tuna, boiled egg.  Lunch: as above.   Dinner:  More rice / bread than normal.    Hates to cook.    Dislikes meat / vegetables.   Drinks mainly water with a bit of milk.    Exercise :  Limited.    Denies foot numbness/ tingling.    Has a few floaters in eyes.  Dealing with infected root canal.    Exam:  /78 (BP Location: Left arm, Patient Position: Sitting, BP Cuff Size: Large adult)   Pulse 74   Temp 36 °C (96.8 °F)   Resp 16   Ht 1.6 m (5' 3\")   Wt 103 kg (227 lb)   SpO2 96%   Gen: NAD  Resp: nonlabored.  Able to speak in full sentences  Psy: pleasant / cooperative.   Neuro:  Alert and oriented x 3    A/P:  1. Uncontrolled type 2 diabetes mellitus with hyperglycemia (HCC)  POCT  A1C    glimepiride (AMARYL) 2 MG Tab    Empagliflozin (JARDIANCE) 25 MG Tab    Lancets    Blood Glucose Test Strips    Blood Glucose Monitoring Suppl Device     Unfortunately a1c shows that her diabetes is out of control at 11.  She admits that she has stopped metformin on her own and is indulging in frequent / daily carbs with lack of exercise.  Started on glimepiride 2 mg and jardiance 25 mg daily.  Also encouraged her to start checking her blood sugar every morning fasting and email me these results weekly.  Discussed goal blood sugar readings consistently 100-150.  Reviewed s/s of hypo vs hyperglycemia.  Discussed long term repercussions of uncontrolled type II diabetes in depth including retinopathy, neuropathy, nephropathy, difficult to heal wounds and increased risk of infection.  "     Discussed importance of staying very well hydrated while taking jardiance d/t increased risk of UTI or yeast infection.      F/u weekly via email re: BG readings and in person in 3 months for a1c in office.

## 2022-02-25 ENCOUNTER — TELEPHONE (OUTPATIENT)
Dept: MEDICAL GROUP | Facility: LAB | Age: 77
End: 2022-02-25
Payer: MEDICARE

## 2022-02-25 DIAGNOSIS — E11.65 UNCONTROLLED TYPE 2 DIABETES MELLITUS WITH HYPERGLYCEMIA (HCC): ICD-10-CM

## 2022-02-27 RX ORDER — DAPAGLIFLOZIN 10 MG/1
1 TABLET, FILM COATED ORAL DAILY
Qty: 30 TABLET | Refills: 4 | Status: SHIPPED
Start: 2022-02-27 | End: 2022-05-31

## 2022-02-28 RX ORDER — LANCETS 30 GAUGE
EACH MISCELLANEOUS
Qty: 30 EACH | Refills: 11 | Status: SHIPPED | OUTPATIENT
Start: 2022-02-28 | End: 2022-03-09 | Stop reason: SDUPTHER

## 2022-03-02 DIAGNOSIS — E11.65 UNCONTROLLED TYPE 2 DIABETES MELLITUS WITH HYPERGLYCEMIA (HCC): ICD-10-CM

## 2022-03-02 DIAGNOSIS — G47.9 SLEEP DISTURBANCE: ICD-10-CM

## 2022-03-02 RX ORDER — OXYBUTYNIN CHLORIDE 10 MG/1
10 TABLET, EXTENDED RELEASE ORAL DAILY
Qty: 90 TABLET | Refills: 3 | Status: SHIPPED | OUTPATIENT
Start: 2022-03-02 | End: 2023-03-02

## 2022-03-02 RX ORDER — ZOLPIDEM TARTRATE 10 MG/1
10 TABLET ORAL
Qty: 90 TABLET | Refills: 1 | Status: SHIPPED | OUTPATIENT
Start: 2022-03-02 | End: 2022-11-29

## 2022-03-02 RX ORDER — MELOXICAM 15 MG/1
TABLET ORAL
Qty: 90 TABLET | Refills: 3 | Status: SHIPPED | OUTPATIENT
Start: 2022-03-02 | End: 2023-03-02

## 2022-03-02 RX ORDER — GLIMEPIRIDE 2 MG/1
2 TABLET ORAL EVERY MORNING
Qty: 90 TABLET | Refills: 3 | Status: SHIPPED | OUTPATIENT
Start: 2022-03-02 | End: 2022-09-15

## 2022-03-03 NOTE — TELEPHONE ENCOUNTER
----- Message from Brianne Wilson sent at 3/2/2022  4:25 PM PST -----  Regarding: Prescription /questions  After spending the last week trying to get answers, I finally have some.    [employer] will NOT cover any Glucose Kits. But Medicare will. So, if you will send a prescription to the [facility] store at [address] I can pick it up at no charge. [employer] WILL cover glimepiride, it is a tier #1 med. All the others you tried  are Tier #3 and have a co-pay of $500to $600 deductible. I cannot afford that. Can I just take the glimepiride  and not add the 2nd med.? Hopefully checking everyday and eating properly I can control it.    I have changed all my Med's to go to [employer] Mail order. ( none to [facility] any more). The pharmacist  today sent all added requests to you today.  Thanks,    Brianne   No

## 2022-03-09 DIAGNOSIS — E11.65 UNCONTROLLED TYPE 2 DIABETES MELLITUS WITH HYPERGLYCEMIA (HCC): ICD-10-CM

## 2022-03-09 RX ORDER — LANCETS 30 GAUGE
EACH MISCELLANEOUS
Qty: 100 EACH | Refills: 3 | Status: SHIPPED | OUTPATIENT
Start: 2022-03-09

## 2022-03-15 DIAGNOSIS — E11.65 UNCONTROLLED TYPE 2 DIABETES MELLITUS WITH HYPERGLYCEMIA (HCC): ICD-10-CM

## 2022-03-15 DIAGNOSIS — E11.9 CONTROLLED TYPE 2 DIABETES MELLITUS WITHOUT COMPLICATION, WITHOUT LONG-TERM CURRENT USE OF INSULIN (HCC): ICD-10-CM

## 2022-03-15 RX ORDER — METFORMIN HYDROCHLORIDE 500 MG/1
500 TABLET, EXTENDED RELEASE ORAL DAILY
Qty: 45 TABLET | Refills: 5 | Status: SHIPPED | OUTPATIENT
Start: 2022-03-15 | End: 2022-06-10 | Stop reason: SDUPTHER

## 2022-05-26 RX ORDER — BENAZEPRIL HYDROCHLORIDE 10 MG/1
TABLET ORAL
Qty: 90 TABLET | Refills: 3 | Status: SHIPPED | OUTPATIENT
Start: 2022-05-26 | End: 2023-07-05

## 2022-05-26 RX ORDER — TRIAMTERENE AND HYDROCHLOROTHIAZIDE 37.5; 25 MG/1; MG/1
TABLET ORAL
Qty: 90 TABLET | Refills: 3 | Status: SHIPPED | OUTPATIENT
Start: 2022-05-26 | End: 2023-07-05

## 2022-05-31 ENCOUNTER — OFFICE VISIT (OUTPATIENT)
Dept: MEDICAL GROUP | Facility: LAB | Age: 77
End: 2022-05-31
Payer: MEDICARE

## 2022-05-31 VITALS
HEART RATE: 78 BPM | TEMPERATURE: 97.2 F | HEIGHT: 63 IN | OXYGEN SATURATION: 97 % | BODY MASS INDEX: 39.69 KG/M2 | WEIGHT: 224 LBS | DIASTOLIC BLOOD PRESSURE: 74 MMHG | RESPIRATION RATE: 20 BRPM | SYSTOLIC BLOOD PRESSURE: 142 MMHG

## 2022-05-31 DIAGNOSIS — E78.5 HYPERLIPIDEMIA, UNSPECIFIED HYPERLIPIDEMIA TYPE: ICD-10-CM

## 2022-05-31 DIAGNOSIS — E11.9 CONTROLLED TYPE 2 DIABETES MELLITUS WITHOUT COMPLICATION, WITHOUT LONG-TERM CURRENT USE OF INSULIN (HCC): ICD-10-CM

## 2022-05-31 DIAGNOSIS — I10 ESSENTIAL HYPERTENSION: ICD-10-CM

## 2022-05-31 DIAGNOSIS — E66.01 CLASS 3 SEVERE OBESITY WITH SERIOUS COMORBIDITY AND BODY MASS INDEX (BMI) OF 40.0 TO 44.9 IN ADULT, UNSPECIFIED OBESITY TYPE (HCC): ICD-10-CM

## 2022-05-31 LAB
HBA1C MFR BLD: 6.6 % (ref 0–5.6)
INT CON NEG: NEGATIVE
INT CON POS: POSITIVE

## 2022-05-31 PROCEDURE — 99214 OFFICE O/P EST MOD 30 MIN: CPT | Performed by: NURSE PRACTITIONER

## 2022-05-31 PROCEDURE — 83036 HEMOGLOBIN GLYCOSYLATED A1C: CPT | Performed by: NURSE PRACTITIONER

## 2022-05-31 ASSESSMENT — FIBROSIS 4 INDEX: FIB4 SCORE: 1.57

## 2022-05-31 NOTE — ASSESSMENT & PLAN NOTE
Chronic issue.  Home blood sugars down to 110 on average.  Cut out almost all carbs / sugars.  Using cauliflower based flours / crusts / rice.  Max 80 carbs per day.  Admits to boring diet.  Eating very plant based with eggs / cheese / oils / healthy fats.  carbs through yogert / humus.  Has lost about 5 lbs in the past 3 months.    Meds: metformin, glimepiride.  farxiga was not covered.   Not exercising regularly.

## 2022-05-31 NOTE — PROGRESS NOTES
"Chief Complaint   Patient presents with   • Diabetes Follow-up       HPI:  Brianne is a 77 yo est female here for type 2 diabetes follow-up.  Unfortunately when she was seen here 3 months ago her A1c was out of control at 11 but she had been off of her medication and not compliant with a diabetic diet.  See below:  Controlled type 2 diabetes mellitus without complication, without long-term current use of insulin (HCC)  Chronic issue.  Home blood sugars down to 110 on average.  Cut out almost all carbs / sugars since our last visit.  Using cauliflower based flours / crusts / rice.  Max 80 carbs per day.  Admits to boring diet.  Eating very plant based with eggs / cheese / oils / healthy fats.  carbs through yogert / humus.  Has lost about 5 lbs in the past 3 months.    Meds: metformin, glimepiride.  farxiga was not covered.   Not exercising regularly.     Exam:  BP (!) 142/74 (BP Location: Left arm, Patient Position: Sitting, BP Cuff Size: Large adult)   Pulse 78   Temp 36.2 °C (97.2 °F)   Resp 20   Ht 1.6 m (5' 3\")   Wt 102 kg (224 lb)   SpO2 97%   Gen: NAD  Resp: nonlabored.  Able to speak in full sentences  Psy: pleasant / cooperative.   Neuro:  Alert and oriented x 3      A/P:  1. Controlled type 2 diabetes mellitus without complication, without long-term current use of insulin (HCC)  POCT Hemoglobin A1C    CBC WITH DIFFERENTIAL    Comp Metabolic Panel    Lipid Profile    TSH    HEMOGLOBIN A1C    MICROALBUMIN CREAT RATIO URINE   2. Essential hypertension     3. Hyperlipidemia, unspecified hyperlipidemia type     4. Class 3 severe obesity with serious comorbidity and body mass index (BMI) of 40.0 to 44.9 in adult, unspecified obesity type (HCC)       Fortunately A1c is down to 6.6!  She does struggle to lose weight and we discussed reducing glimepiride down to 1 mg once daily but continue metformin 500 mg XR once daily.  She will monitor her blood sugars and I gave her written instructions regarding what to " look for in terms of blood sugar readings and reduction of glimepiride.  Encouraged her to continue a strict diabetic diet, efforts at exercise, portion control and weight loss.  Recommend a full lab panel and then following up in 3 months.  Blood pressure was slightly elevated today, encouraged her to check this at home and let me know if readings are consistently greater than 140/90, preferably closer to 120/80.  Encouraged updated eye appt for retinal screening.   F/u 3 months.

## 2022-05-31 NOTE — PATIENT INSTRUCTIONS
Try reducing glimepiride to 1/2 pill for the next month.  After one month if fasting blood sugars are staying below 150, ok to cut out glimepiride and stay only on metformin.   Ultimate goal is consistent morning / fasting blood sugars around 120 but worst case scenario below 150.

## 2022-06-10 DIAGNOSIS — E11.65 UNCONTROLLED TYPE 2 DIABETES MELLITUS WITH HYPERGLYCEMIA (HCC): ICD-10-CM

## 2022-06-13 RX ORDER — METFORMIN HYDROCHLORIDE 500 MG/1
500 TABLET, EXTENDED RELEASE ORAL DAILY
Qty: 180 TABLET | Refills: 3 | Status: SHIPPED | OUTPATIENT
Start: 2022-06-13 | End: 2022-09-11

## 2022-07-05 RX ORDER — SIMVASTATIN 40 MG
TABLET ORAL
Qty: 90 TABLET | Refills: 3 | Status: SHIPPED | OUTPATIENT
Start: 2022-07-05 | End: 2023-09-20

## 2022-09-13 ENCOUNTER — HOSPITAL ENCOUNTER (OUTPATIENT)
Dept: LAB | Facility: MEDICAL CENTER | Age: 77
End: 2022-09-13
Attending: NURSE PRACTITIONER
Payer: MEDICARE

## 2022-09-13 DIAGNOSIS — E11.9 CONTROLLED TYPE 2 DIABETES MELLITUS WITHOUT COMPLICATION, WITHOUT LONG-TERM CURRENT USE OF INSULIN (HCC): ICD-10-CM

## 2022-09-13 LAB
ALBUMIN SERPL BCP-MCNC: 3.8 G/DL (ref 3.2–4.9)
ALBUMIN/GLOB SERPL: 1.3 G/DL
ALP SERPL-CCNC: 132 U/L (ref 30–99)
ALT SERPL-CCNC: 11 U/L (ref 2–50)
ANION GAP SERPL CALC-SCNC: 11 MMOL/L (ref 7–16)
AST SERPL-CCNC: 13 U/L (ref 12–45)
BASOPHILS # BLD AUTO: 1.3 % (ref 0–1.8)
BASOPHILS # BLD: 0.08 K/UL (ref 0–0.12)
BILIRUB SERPL-MCNC: 0.5 MG/DL (ref 0.1–1.5)
BUN SERPL-MCNC: 18 MG/DL (ref 8–22)
CALCIUM SERPL-MCNC: 9.8 MG/DL (ref 8.5–10.5)
CHLORIDE SERPL-SCNC: 104 MMOL/L (ref 96–112)
CHOLEST SERPL-MCNC: 137 MG/DL (ref 100–199)
CO2 SERPL-SCNC: 25 MMOL/L (ref 20–33)
CREAT SERPL-MCNC: 0.86 MG/DL (ref 0.5–1.4)
CREAT UR-MCNC: 36.27 MG/DL
EOSINOPHIL # BLD AUTO: 0.21 K/UL (ref 0–0.51)
EOSINOPHIL NFR BLD: 3.3 % (ref 0–6.9)
ERYTHROCYTE [DISTWIDTH] IN BLOOD BY AUTOMATED COUNT: 44.5 FL (ref 35.9–50)
EST. AVERAGE GLUCOSE BLD GHB EST-MCNC: 131 MG/DL
FASTING STATUS PATIENT QL REPORTED: NORMAL
GFR SERPLBLD CREATININE-BSD FMLA CKD-EPI: 69 ML/MIN/1.73 M 2
GLOBULIN SER CALC-MCNC: 2.9 G/DL (ref 1.9–3.5)
GLUCOSE SERPL-MCNC: 142 MG/DL (ref 65–99)
HBA1C MFR BLD: 6.2 % (ref 4–5.6)
HCT VFR BLD AUTO: 38.2 % (ref 37–47)
HDLC SERPL-MCNC: 40 MG/DL
HGB BLD-MCNC: 13.2 G/DL (ref 12–16)
IMM GRANULOCYTES # BLD AUTO: 0.02 K/UL (ref 0–0.11)
IMM GRANULOCYTES NFR BLD AUTO: 0.3 % (ref 0–0.9)
LDLC SERPL CALC-MCNC: 75 MG/DL
LYMPHOCYTES # BLD AUTO: 1.37 K/UL (ref 1–4.8)
LYMPHOCYTES NFR BLD: 21.7 % (ref 22–41)
MCH RBC QN AUTO: 30.9 PG (ref 27–33)
MCHC RBC AUTO-ENTMCNC: 34.6 G/DL (ref 33.6–35)
MCV RBC AUTO: 89.5 FL (ref 81.4–97.8)
MICROALBUMIN UR-MCNC: <1.2 MG/DL
MICROALBUMIN/CREAT UR: NORMAL MG/G (ref 0–30)
MONOCYTES # BLD AUTO: 0.54 K/UL (ref 0–0.85)
MONOCYTES NFR BLD AUTO: 8.6 % (ref 0–13.4)
NEUTROPHILS # BLD AUTO: 4.09 K/UL (ref 2–7.15)
NEUTROPHILS NFR BLD: 64.8 % (ref 44–72)
NRBC # BLD AUTO: 0 K/UL
NRBC BLD-RTO: 0 /100 WBC
PLATELET # BLD AUTO: 172 K/UL (ref 164–446)
PMV BLD AUTO: 11.2 FL (ref 9–12.9)
POTASSIUM SERPL-SCNC: 4.2 MMOL/L (ref 3.6–5.5)
PROT SERPL-MCNC: 6.7 G/DL (ref 6–8.2)
RBC # BLD AUTO: 4.27 M/UL (ref 4.2–5.4)
SODIUM SERPL-SCNC: 140 MMOL/L (ref 135–145)
TRIGL SERPL-MCNC: 111 MG/DL (ref 0–149)
TSH SERPL DL<=0.005 MIU/L-ACNC: 3.08 UIU/ML (ref 0.38–5.33)
WBC # BLD AUTO: 6.3 K/UL (ref 4.8–10.8)

## 2022-09-13 PROCEDURE — 84443 ASSAY THYROID STIM HORMONE: CPT

## 2022-09-13 PROCEDURE — 82570 ASSAY OF URINE CREATININE: CPT

## 2022-09-13 PROCEDURE — 85025 COMPLETE CBC W/AUTO DIFF WBC: CPT

## 2022-09-13 PROCEDURE — 83036 HEMOGLOBIN GLYCOSYLATED A1C: CPT | Mod: GA

## 2022-09-13 PROCEDURE — 80061 LIPID PANEL: CPT

## 2022-09-13 PROCEDURE — 82043 UR ALBUMIN QUANTITATIVE: CPT

## 2022-09-13 PROCEDURE — 80053 COMPREHEN METABOLIC PANEL: CPT

## 2022-09-13 PROCEDURE — 36415 COLL VENOUS BLD VENIPUNCTURE: CPT | Mod: GA

## 2022-09-15 ENCOUNTER — OFFICE VISIT (OUTPATIENT)
Dept: MEDICAL GROUP | Facility: LAB | Age: 77
End: 2022-09-15
Payer: MEDICARE

## 2022-09-15 VITALS
DIASTOLIC BLOOD PRESSURE: 64 MMHG | OXYGEN SATURATION: 96 % | RESPIRATION RATE: 16 BRPM | HEART RATE: 58 BPM | WEIGHT: 219 LBS | BODY MASS INDEX: 38.8 KG/M2 | HEIGHT: 63 IN | SYSTOLIC BLOOD PRESSURE: 128 MMHG | TEMPERATURE: 96.6 F

## 2022-09-15 DIAGNOSIS — E11.9 CONTROLLED TYPE 2 DIABETES MELLITUS WITHOUT COMPLICATION, WITHOUT LONG-TERM CURRENT USE OF INSULIN (HCC): ICD-10-CM

## 2022-09-15 DIAGNOSIS — I35.0 AORTIC STENOSIS, MILD: ICD-10-CM

## 2022-09-15 DIAGNOSIS — I77.819 AORTIC DILATATION (HCC): ICD-10-CM

## 2022-09-15 DIAGNOSIS — R19.7 DIARRHEA, UNSPECIFIED TYPE: ICD-10-CM

## 2022-09-15 PROCEDURE — 99214 OFFICE O/P EST MOD 30 MIN: CPT | Performed by: NURSE PRACTITIONER

## 2022-09-15 ASSESSMENT — FIBROSIS 4 INDEX: FIB4 SCORE: 1.78

## 2022-09-15 NOTE — PROGRESS NOTES
"Chief Complaint   Patient presents with    Lab Results       HPI:  Brianne is a 76-year-old established female here to follow-up on type 2 diabetes, diarrhea and her heart hx. Has been working hard on a diabetic diet and has A1c down to 6.2 from 6.6 last May and unfortunately as high as 11.8, 6 months ago.  No microalbuminuria and kidneys appear healthy on recent labs.  Diabetes was treated with glimepiride 1 mg once a day but quit this completely a few months ago.  Taking metformin  mg once daily.   Has lost another 5 lbs since our visit 5/2022.    Still having chronic diarrhea once a week.  Saw [facility] - negative w/u.  Denies CP or trouble breathing.   [place]      Exam:  /64 (BP Location: Left arm, Patient Position: Sitting, BP Cuff Size: Large adult)   Pulse (!) 58   Temp 35.9 °C (96.6 °F)   Resp 16   Ht 1.6 m (5' 3\")   Wt 99.3 kg (219 lb)   SpO2 96%   Gen: NAD  Ears :TM translucent bilaterally.   Resp: nonlabored.  Able to speak in full sentences  CV RRR with + murmur  Psy: pleasant / cooperative.   Neuro:  Alert and oriented x 3    A/P:  1. Controlled type 2 diabetes mellitus without complication, without long-term current use of insulin (HCC)        2. Diarrhea, unspecified type        3. Aortic dilatation (HCC) - slight echo 2020 - 3.9 cm  EC-ECHOCARDIOGRAM COMPLETE W/O CONT      4. Aortic stenosis, mild - echo 2020  EC-ECHOCARDIOGRAM COMPLETE W/O CONT        In regards to diarrhea - stop metformin x one month and restart if diarrhea does not resolve, then try eliminating meloxicam for one month - again ok to restart if diarrhea does not resolve.  She will let me know how this goes.     Diabetes control excellent!  Recheck 6 mo.     Recheck echo for stability.      Declines flu shot.      F/u here 6 mo.    "

## 2022-09-27 ENCOUNTER — TELEMEDICINE (OUTPATIENT)
Dept: MEDICAL GROUP | Facility: LAB | Age: 77
End: 2022-09-27
Payer: MEDICARE

## 2022-09-27 DIAGNOSIS — J06.9 ACUTE URI: ICD-10-CM

## 2022-09-27 PROCEDURE — 99213 OFFICE O/P EST LOW 20 MIN: CPT | Mod: 95 | Performed by: NURSE PRACTITIONER

## 2022-09-27 NOTE — PROGRESS NOTES
"Virtual Visit: Established Patient   This visit was conducted via Zoom using secure and encrypted videoconferencing technology.   The patient was in their home in the state of [place].    The patient's identity was confirmed and verbal consent was obtained for this virtual visit.    Subjective:   CC:   Uri    HPI:  Brianne is a 76 y.o. female presenting for evaluation and management of:    Acute URI:  New onset fever of 101, HA, chills, ST, congestion with bloody mucus x a week.  Symptoms slowly improving with exception of minimal SOB on exertion and low energy.  Still very congested but mucus is clear / bloody.  Denies residual HA. Can sleep well at night.  Congested cough but improving.    OTC: nothing.    Rx: amox from dentist - 500 mg qid x 7 d - took the whole course.    Has been able to decorate for holidays and go out to dinners.    Blood sugars: \"ok,\" off metformin - .      Also has seasonal allergy symptoms of congestion chronically.      Current medicines (including changes today)  Current Outpatient Medications   Medication Sig Dispense Refill    simvastatin (ZOCOR) 40 MG Tab TAKE 1 TABLET EVERY EVENING 90 Tablet 3    triamterene-hctz (MAXZIDE-25/DYAZIDE) 37.5-25 MG Tab TAKE 1 TABLET EVERY DAY 90 Tablet 3    benazepril (LOTENSIN) 10 MG Tab TAKE 1 TABLET EVERY DAY 90 Tablet 3    Blood Glucose Test Strips Test strips order: Test strips for ACCUCHEK GUIDE  meter. Sig: check blood sugar once daily in the morning fasting. 30 Strip 5    Lancets Lancets order: SOFT CLIX Lancets FOR ACCUCHECK GUIDE METER . Sig: use DAILY fasting in the morning. 100 Each 3    meloxicam (MOBIC) 15 MG tablet TAKE ONE TABLET BY MOUTH ONCE DAILY WITH FOOD AND WATER FOR INFLAMMATION /ARTHRITIS 90 Tablet 3    oxybutynin SR (DITROPAN-XL) 10 MG CR tablet Take 1 Tablet by mouth every day. 90 Tablet 3    Cholecalciferol (VITAMIN D3) 1000 units Cap Take  by mouth.      vitamin E (VITAMIN E) 1000 UNIT Cap Take 1 Cap by mouth every day.   "    Calcium Carbonate-Vit D-Min (CALCIUM 1200 PO) Take  by mouth.      Probiotic Product (PROBIOTIC-10 PO) Take  by mouth.       No current facility-administered medications for this visit.       Patient Active Problem List    Diagnosis Date Noted    DDD (degenerative disc disease), lumbar 06/22/2021    Class 3 severe obesity in adult (HCC) 06/22/2021    Bilateral lower extremity edema 12/16/2020    Aortic stenosis, mild - echo 2020 01/22/2020    Aortic dilatation ([facility]) - slight echo 2020 - 3.9 cm 01/22/2020    Controlled type 2 diabetes mellitus without complication, without long-term current use of insulin ([facility]) 06/13/2019    Essential hypertension 05/02/2019    Mixed stress and urge urinary incontinence 05/02/2019    Status post right knee replacement - Dr. Acosta (failed) 05/02/2019    Diverticulosis of colon 06/12/2014    Hyperlipidemia 02/27/2013        Objective:   There were no vitals taken for this visit.    Physical Exam:  Gen: NAD  Resp: nonlabored.  Able to speak in full sentences  Psy: pleasant / cooperative.   Neuro:  Alert and oriented x 3    Assessment and Plan:   The following treatment plan was discussed:     1. Acute URI          Improving.  Add in antihistamine therapy with generic loratidine 10 mg daily for 3-4 days and then email me with progress.  Declines an inhaler, stating SOB is not significant.  BG under control.  Completed amox x 7 d  - discussed possibility of antibiotic resistance and she will email me if nasal mucus turns purulent (yellow thick / green).    She is staying well hydrated.

## 2022-09-29 ENCOUNTER — PATIENT MESSAGE (OUTPATIENT)
Dept: MEDICAL GROUP | Facility: LAB | Age: 77
End: 2022-09-29
Payer: MEDICARE

## 2022-09-29 RX ORDER — AMOXICILLIN AND CLAVULANATE POTASSIUM 875; 125 MG/1; MG/1
1 TABLET, FILM COATED ORAL 2 TIMES DAILY
Qty: 14 TABLET | Refills: 0 | Status: SHIPPED | OUTPATIENT
Start: 2022-09-29 | End: 2022-10-06

## 2022-11-03 ENCOUNTER — PATIENT MESSAGE (OUTPATIENT)
Dept: HEALTH INFORMATION MANAGEMENT | Facility: OTHER | Age: 77
End: 2022-11-03

## 2022-12-14 ENCOUNTER — HOSPITAL ENCOUNTER (OUTPATIENT)
Dept: RADIOLOGY | Facility: MEDICAL CENTER | Age: 77
End: 2022-12-14
Attending: FAMILY MEDICINE
Payer: MEDICARE

## 2022-12-14 DIAGNOSIS — M25.562 LEFT KNEE PAIN, UNSPECIFIED CHRONICITY: ICD-10-CM

## 2022-12-14 PROCEDURE — 73562 X-RAY EXAM OF KNEE 3: CPT | Mod: LT

## 2022-12-23 ENCOUNTER — APPOINTMENT (OUTPATIENT)
Dept: CARDIOLOGY | Facility: MEDICAL CENTER | Age: 77
End: 2022-12-23
Attending: NURSE PRACTITIONER
Payer: MEDICARE

## 2023-01-18 RX ORDER — BLOOD SUGAR DIAGNOSTIC
STRIP MISCELLANEOUS
Qty: 30 STRIP | Refills: 5 | Status: SHIPPED | OUTPATIENT
Start: 2023-01-18

## 2023-01-18 NOTE — TELEPHONE ENCOUNTER
Received request via: Pharmacy    Was the patient seen in the last year in this department? Yes  9/27/2022  Does the patient have an active prescription (recently filled or refills available) for medication(s) requested? No    Does the patient have residential Plus and need 100 day supply (blood pressure, diabetes and cholesterol meds only)? Patient does not have SCP

## 2023-02-01 ENCOUNTER — TELEPHONE (OUTPATIENT)
Dept: MEDICAL GROUP | Facility: LAB | Age: 78
End: 2023-02-01
Payer: MEDICARE

## 2023-02-01 NOTE — TELEPHONE ENCOUNTER
Lvm to let us know if she has had a more recent eye exam than March of 2021.     2/23/23? Brianne said she tried to make eye appt and was told they are too short staffed to make appt and she is in [place] of [place] out [place] to [place] an appt.

## 2023-02-27 ENCOUNTER — OFFICE VISIT (OUTPATIENT)
Dept: MEDICAL GROUP | Facility: LAB | Age: 78
End: 2023-02-27
Payer: MEDICARE

## 2023-02-27 ENCOUNTER — HOSPITAL ENCOUNTER (OUTPATIENT)
Dept: CARDIOLOGY | Facility: MEDICAL CENTER | Age: 78
End: 2023-02-27
Attending: NURSE PRACTITIONER
Payer: MEDICARE

## 2023-02-27 VITALS
HEIGHT: 63 IN | WEIGHT: 227 LBS | OXYGEN SATURATION: 96 % | SYSTOLIC BLOOD PRESSURE: 126 MMHG | HEART RATE: 96 BPM | TEMPERATURE: 96.5 F | BODY MASS INDEX: 40.22 KG/M2 | DIASTOLIC BLOOD PRESSURE: 72 MMHG | RESPIRATION RATE: 16 BRPM

## 2023-02-27 DIAGNOSIS — I35.0 AORTIC STENOSIS, MILD: ICD-10-CM

## 2023-02-27 DIAGNOSIS — E66.01 MORBID (SEVERE) OBESITY DUE TO EXCESS CALORIES (HCC): ICD-10-CM

## 2023-02-27 DIAGNOSIS — I77.819 AORTIC DILATATION (HCC): ICD-10-CM

## 2023-02-27 DIAGNOSIS — E11.65 UNCONTROLLED TYPE 2 DIABETES MELLITUS WITH HYPERGLYCEMIA (HCC): ICD-10-CM

## 2023-02-27 PROBLEM — E11.9 CONTROLLED TYPE 2 DIABETES MELLITUS WITHOUT COMPLICATION, WITHOUT LONG-TERM CURRENT USE OF INSULIN (HCC): Status: RESOLVED | Noted: 2019-06-13 | Resolved: 2023-02-27

## 2023-02-27 LAB
HBA1C MFR BLD: 9 % (ref ?–5.8)
LV EJECT FRACT  99904: 65
LV EJECT FRACT MOD 2C 99903: 65.16
LV EJECT FRACT MOD 4C 99902: 63.54
LV EJECT FRACT MOD BP 99901: 63.8
POCT INT CON NEG: POSITIVE
POCT INT CON POS: NEGATIVE

## 2023-02-27 PROCEDURE — 93306 TTE W/DOPPLER COMPLETE: CPT

## 2023-02-27 PROCEDURE — 83036 HEMOGLOBIN GLYCOSYLATED A1C: CPT | Performed by: NURSE PRACTITIONER

## 2023-02-27 PROCEDURE — 99214 OFFICE O/P EST MOD 30 MIN: CPT | Performed by: NURSE PRACTITIONER

## 2023-02-27 PROCEDURE — 93306 TTE W/DOPPLER COMPLETE: CPT | Mod: 26 | Performed by: INTERNAL MEDICINE

## 2023-02-27 RX ORDER — SEMAGLUTIDE 1.34 MG/ML
0.25 INJECTION, SOLUTION SUBCUTANEOUS
Qty: 1.5 ML | Refills: 5 | Status: SHIPPED | OUTPATIENT
Start: 2023-02-27

## 2023-02-27 RX ORDER — GLIMEPIRIDE 2 MG/1
2 TABLET ORAL EVERY MORNING
Qty: 90 TABLET | Refills: 3 | Status: SHIPPED | OUTPATIENT
Start: 2023-02-27 | End: 2023-07-05 | Stop reason: SDUPTHER

## 2023-02-27 ASSESSMENT — PATIENT HEALTH QUESTIONNAIRE - PHQ9: CLINICAL INTERPRETATION OF PHQ2 SCORE: 0

## 2023-02-27 ASSESSMENT — FIBROSIS 4 INDEX: FIB4 SCORE: 1.8

## 2023-02-27 NOTE — PROGRESS NOTES
"Chief Complaint   Patient presents with    Diabetes Follow-up     HPI:  Brianne is a 76 yo est female here to f/u on diabetes and echocardiogram:   [place].      Uncontrolled type 2 diabetes mellitus with hyperglycemia (HCC)  Chronic issue.  \"I haven't been eating properly b/c of stress.\"  Eating a lot of carbs / junk food x a few mo due to upcoming move.  Intolerant of metformin due to GI issues -caused stool incontinence/diarrhea.  Has taken glimepiride in the past without problems.  Home blood sugar readings have really jumped in the past few weeks with increased \"junk\" intake.  Home BG was 120-130 in January - not taking it daily.  Off all diabetic meds per her preference after our visit in the fall when a1c was down to 6.2%.  has struggled with her weight her whole life.    We tried to get Jardiance covered for her for the cardiovascular benefit although Medicare denied it/it was very expensive for her.    Aortic stenosis with aortic dilatation -   Noted on echocardiogram in 2020.  Had echo to check on this today -no mention of aortic stenosis or aortic dilatation.  Denies abnormal SOB or leg swelling (chronic issue for her).      Exam:  /72 (BP Location: Left arm, Patient Position: Sitting, BP Cuff Size: Large adult)   Pulse 96   Temp 35.8 °C (96.5 °F)   Resp 16   Ht 1.6 m (5' 3\")   Wt 103 kg (227 lb)   SpO2 96%   Gen: NAD  Resp: nonlabored.  Able to speak in full sentences  Psy: pleasant / cooperative.   Neuro:  Alert and oriented x 3  Monofilament testing with a 10 gram force: sensation intact: intact bilaterally  Visual Inspection: Feet without maceration, ulcers, fissures.  Pedal pulses: intact bilaterally    A/P:  1. Uncontrolled type 2 diabetes mellitus with hyperglycemia (HCC)  POCT  A1C    glimepiride (AMARYL) 2 MG Tab    Semaglutide,0.25 or 0.5MG/DOS, (OZEMPIC, 0.25 OR 0.5 MG/DOSE,) 2 MG/1.5ML Solution Pen-injector      2. Morbid (severe) obesity due to excess calories (HCC)   "      3. Body mass index (BMI) 38.0-38.9, adult        4. Aortic dilatation (HCC) - slight echo 2020 - 3.9 cm        5. Aortic stenosis, mild - echo 2020          Unfortunately A1c is out of control at 9 which she attributes to lack of dietary control/not following a diabetic diet over the past couple of weeks in particular.  She is willing to go back on glimepiride.  She would physiologically benefit from going on a GLP-1 agonist and I prescribed Ozempic today which would help her lower her A1c, lose weight and has cardiovascular benefits.  She will let me know if Ozempic is not covered once she gets to the pharmacy.  If Ozempic is covered affordably she understands she should inject it once weekly and drastically lower her portions to prevent nausea/vomiting.  Discussed side effects and how to take Ozempic.  We discussed proper foot care with diabetes.  Discussed the importance of seeing her eye doctor yearly.  Encouraged her to start an exercise program.    Reviewed recent echocardiogram results which does not show aortic stenosis or dilatation that was seen in 2020.    She is moving to [place] in April and I encouraged her to find a new primary care as soon as possible/have her A1c rechecked in May or June to make sure it is back down below 7.5%.    Side effects of all medications prescribed today were discussed with the patient including how to take the medications and proper dosage. Discussed repercussions of not taking the medications as prescribed. Instructed to call the office should she have any negative side effects or problems with the medications.      [facility]    Diagnosis: E66.01 - Morbid (severe) obesity due to excess calories (HCC)  Z68.38 - Body mass index (BMI) 38.0-38.9, adult  Comorbidity Diagnosis: Controlled type 2 diabetes mellitus without complication, without long-term current use of insulin (HCC)  The current BMI is 38.79 kg/m2 as of 02/27/23 12:46 PST  Assessment and plan: Chronic,  improving. Encouraged healthy diet and physical activity changes with a goal of weight loss. Follow up at least annually. The comorbid condition is improving based on today's assessment. Continue current treatment plan. Follow up in 6 months.    Diagnosis to address: I77.819 - Aortic dilatation (HCC)  Assessment and plan: Chronic, stable. Continue with current defined treatment plan: f/u echo done today - not resulted. Follow-up at least annually.  Diagnosis: E11.65 - Type 2 diabetes mellitus with hyperglycemia (HCC)  Assessment and plan: Chronic, stable. Continue with current defined treatment plan: Continue current medications, diabetic diet and exercise. Follow-up 6 mo.   Last edited 02/27/23 12:47 PST by JOSE Lovett

## 2023-02-27 NOTE — LETTER
February 27, 2023         Patient: Brianne Wilson   YOB: 1945   Date of Visit: 2/27/2023             Current Outpatient Medications:     Accu-Chek Guide, USE TO CHECK BLOOD SUGAR ONCE DAILY IN THE MORNING FASTING (100 DAY SUPPLY), Taking    zolpidem, TAKE 1 TABLET AT BEDTIME AS NEEDED FOR SLEEP, Taking    simvastatin, TAKE 1 TABLET EVERY EVENING, Taking    triamterene-hctz, TAKE 1 TABLET EVERY DAY, Taking    benazepril, TAKE 1 TABLET EVERY DAY, Taking    Blood Glucose Test Strips, Test strips order: Test strips for ACCUCHEK GUIDE  meter. Sig: check blood sugar once daily in the morning fasting., Taking    Lancets, Lancets order: SOFT CLIX Lancets FOR ACCUCHECK GUIDE METER . Sig: use DAILY fasting in the morning., Taking    meloxicam, TAKE ONE TABLET BY MOUTH ONCE DAILY WITH FOOD AND WATER FOR INFLAMMATION /ARTHRITIS, Taking    oxybutynin SR, 10 mg, Oral, DAILY, Taking    Vitamin D3, Take  by mouth., Taking    vitamin E, 1 Capsule, Oral, DAILY, Taking    Calcium Carbonate-Vit D-Min (CALCIUM 1200 PO), Take  by mouth., Taking    Probiotic Product (PROBIOTIC-10 PO), Take  by mouth., Taking      If you have any questions or concerns, please don't hesitate to call.        Sincerely,           KALPANA Lovett.  Electronically Signed

## 2023-02-27 NOTE — ASSESSMENT & PLAN NOTE
"Chronic issue.  \"I haven't been eating properly b/c of stress.\"  Eating a lot of carbs / junk food x a few mo due to upcoming move.  Intolerant of metformin due to GI issues.  Has taken glimepiride in the past without problems.    "

## 2023-03-02 RX ORDER — MELOXICAM 15 MG/1
TABLET ORAL
Qty: 90 TABLET | Refills: 3 | Status: SHIPPED | OUTPATIENT
Start: 2023-03-02

## 2023-03-02 RX ORDER — OXYBUTYNIN CHLORIDE 10 MG/1
TABLET, EXTENDED RELEASE ORAL
Qty: 90 TABLET | Refills: 3 | Status: SHIPPED | OUTPATIENT
Start: 2023-03-02

## 2023-03-22 ENCOUNTER — OFFICE VISIT (OUTPATIENT)
Dept: URGENT CARE | Facility: PHYSICIAN GROUP | Age: 78
End: 2023-03-22
Payer: MEDICARE

## 2023-03-22 VITALS
OXYGEN SATURATION: 98 % | HEART RATE: 56 BPM | RESPIRATION RATE: 18 BRPM | BODY MASS INDEX: 40.22 KG/M2 | SYSTOLIC BLOOD PRESSURE: 134 MMHG | TEMPERATURE: 97.5 F | HEIGHT: 63 IN | WEIGHT: 227 LBS | DIASTOLIC BLOOD PRESSURE: 86 MMHG

## 2023-03-22 DIAGNOSIS — H93.8X2 EAR PRESSURE, LEFT: ICD-10-CM

## 2023-03-22 DIAGNOSIS — H61.21 CERUMEN DEBRIS ON TYMPANIC MEMBRANE OF RIGHT EAR: ICD-10-CM

## 2023-03-22 PROCEDURE — 99213 OFFICE O/P EST LOW 20 MIN: CPT | Performed by: NURSE PRACTITIONER

## 2023-03-22 ASSESSMENT — ENCOUNTER SYMPTOMS
ABDOMINAL PAIN: 0
DIARRHEA: 0
NAUSEA: 0
FEVER: 0
COUGH: 0
SHORTNESS OF BREATH: 0
DIZZINESS: 0
WEAKNESS: 0
VOMITING: 0
CONSTIPATION: 0
EYE REDNESS: 0
EYE DISCHARGE: 0
SORE THROAT: 0
NECK PAIN: 0
HEADACHES: 0
MYALGIAS: 0
CHILLS: 0
WHEEZING: 0

## 2023-03-22 ASSESSMENT — FIBROSIS 4 INDEX: FIB4 SCORE: 1.8

## 2023-03-22 NOTE — PROGRESS NOTES
"Subjective     Brianne Wilson is a 77 y.o. female who presents with Otalgia (R ear, fullness, discomfort, x 1 week)            Otalgia   Pertinent negatives include no abdominal pain, coughing, diarrhea, ear discharge, headaches, hearing loss, neck pain, rash, sore throat or vomiting. States has been experiencing right ear fullness and \"fluttering\" to right ear x1 week.  Denies pain or ear drainage.  Does have issues with seasonal allergies but is not having any sinus facial pressure, postnasal drainage or sore throat.    PMH:  has a past medical history of Cataract, Elevated blood sugar, High cholesterol, Hypertension, Right knee pain (12/21/2018), Urinary bladder disorder, and Urinary incontinence.  MEDS:   Current Outpatient Medications:     meloxicam (MOBIC) 15 MG tablet, TAKE 1 TABLET EVERY DAY WITH FOOD AND WATER FOR INFLAMMATION/ARTHRITIS, Disp: 90 Tablet, Rfl: 3    oxybutynin SR (DITROPAN-XL) 10 MG CR tablet, TAKE 1 TABLET EVERY DAY, Disp: 90 Tablet, Rfl: 3    glimepiride (AMARYL) 2 MG Tab, Take 1 Tablet by mouth every morning. With food or take with your first meal, Disp: 90 Tablet, Rfl: 3    Semaglutide,0.25 or 0.5MG/DOS, (OZEMPIC, 0.25 OR 0.5 MG/DOSE,) 2 MG/1.5ML Solution Pen-injector, Inject 0.25 mg under the skin every 7 days., Disp: 1.5 mL, Rfl: 5    ACCU-CHEK GUIDE strip, USE TO CHECK BLOOD SUGAR ONCE DAILY IN THE MORNING FASTING (100 DAY SUPPLY), Disp: 30 Strip, Rfl: 5    simvastatin (ZOCOR) 40 MG Tab, TAKE 1 TABLET EVERY EVENING, Disp: 90 Tablet, Rfl: 3    triamterene-hctz (MAXZIDE-25/DYAZIDE) 37.5-25 MG Tab, TAKE 1 TABLET EVERY DAY, Disp: 90 Tablet, Rfl: 3    benazepril (LOTENSIN) 10 MG Tab, TAKE 1 TABLET EVERY DAY, Disp: 90 Tablet, Rfl: 3    Blood Glucose Test Strips, Test strips order: Test strips for ACCUCHEK GUIDE  meter. Sig: check blood sugar once daily in the morning fasting., Disp: 30 Strip, Rfl: 5    Lancets, Lancets order: SOFT CLIX Lancets FOR ACCUCHECK GUIDE METER . Sig: use DAILY " "fasting in the morning., Disp: 100 Each, Rfl: 3    Cholecalciferol (VITAMIN D3) 1000 units Cap, Take  by mouth., Disp: , Rfl:     vitamin E (VITAMIN E) 1000 UNIT Cap, Take 1 Cap by mouth every day., Disp: , Rfl:     Calcium Carbonate-Vit D-Min (CALCIUM 1200 PO), Take  by mouth., Disp: , Rfl:     Probiotic Product (PROBIOTIC-10 PO), Take  by mouth., Disp: , Rfl:   ALLERGIES: No Known Allergies  SURGHX:   Past Surgical History:   Procedure Laterality Date    KNEE ARTHROPLASTY TOTAL Right 1/3/2019    Procedure: KNEE ARTHROPLASTY TOTAL;  Surgeon: Mil Acosta M.D.;  Location: SURGERY [facility];  Service: Orthopedics    COLONOSCOPY  01/2018    COLONOSCOPY  2015    polyp removed    CHOLECYSTECTOMY  1974    with appendectomy    PRIMARY C SECTION  05/24/1971    TONSILLECTOMY  1960    LUMPECTOMY Left 1990's    breast-negative     SOCHX:  reports that she has never smoked. She has never used smokeless tobacco. She reports current alcohol use. She reports that she does not use drugs.  FH: Family history was reviewed, no pertinent findings to report      Review of Systems   Constitutional:  Negative for chills, fever and malaise/fatigue.   HENT:  Positive for ear pain. Negative for congestion, ear discharge, hearing loss and sore throat.    Eyes:  Negative for discharge and redness.   Respiratory:  Negative for cough, shortness of breath and wheezing.    Gastrointestinal:  Negative for abdominal pain, constipation, diarrhea, nausea and vomiting.   Musculoskeletal:  Negative for myalgias and neck pain.   Skin:  Negative for itching and rash.   Neurological:  Negative for dizziness, weakness and headaches.   Endo/Heme/Allergies:  Positive for environmental allergies.   All other systems reviewed and are negative.           Objective     /86   Pulse (!) 56   Temp 36.4 °C (97.5 °F)   Resp 18   Ht 1.6 m (5' 3\")   Wt 103 kg (227 lb)   SpO2 98%   BMI 40.21 kg/m²      Physical Exam  Vitals reviewed. "   Constitutional:       General: She is awake. She is not in acute distress.     Appearance: Normal appearance. She is well-developed. She is not ill-appearing, toxic-appearing or diaphoretic.   HENT:      Head: Normocephalic.      Right Ear: There is impacted cerumen.      Left Ear: Ear canal and external ear normal. A middle ear effusion is present.      Ears:      Comments: Ears: Jocelyn pinnae. Right external auditory canal with some occlusion with impacted cerumen. Procedure: Pt then prepped and lavaged by MA with resolution of impaction.   TM pearly gray with normal light reflex bilaterally.        Cardiovascular:      Rate and Rhythm: Normal rate.   Pulmonary:      Effort: Pulmonary effort is normal.   Musculoskeletal:         General: Normal range of motion.   Skin:     General: Skin is warm and dry.   Neurological:      Mental Status: She is alert and oriented to person, place, and time.   Psychiatric:         Attention and Perception: Attention normal.         Mood and Affect: Mood normal.         Speech: Speech normal.         Behavior: Behavior normal. Behavior is cooperative.                           Assessment & Plan        1. Ear pressure, left    - Ear Cerumen Removal    2. Cerumen debris on tympanic membrane of right ear    - Ear Cerumen Removal    -May use over the counter NSAID/Tylenol for any fever or ear pain  -May use ear wax drops as needed for any muffled hearing without ear discharge or bleeding from ear  -May return to Urgent Care when ear flushing is periodically needed

## 2023-04-25 ENCOUNTER — TELEPHONE (OUTPATIENT)
Dept: HEALTH INFORMATION MANAGEMENT | Facility: OTHER | Age: 78
End: 2023-04-25

## 2023-06-15 DIAGNOSIS — G47.9 SLEEP DISTURBANCE: ICD-10-CM

## 2023-06-19 RX ORDER — ZOLPIDEM TARTRATE 10 MG/1
10 TABLET ORAL
Qty: 90 TABLET | Refills: 0 | Status: SHIPPED | OUTPATIENT
Start: 2023-06-19 | End: 2023-08-30

## 2023-06-19 NOTE — PROGRESS NOTES
Call to patient to discuss nausea.  She stated that an hour after she takes the antibiotic she throws up and has stomach pain. She is eating when she takes the medication. Continues with URI symptoms.  It was explained that Doctor does not have availability today and is out of the office for jury duty the rest of the week, she should return to the [facility].  She voiced understanding and had no further questions.    Skin assessment performed with 2 rns. Skin wnl aside from R knee surgical dressing.

## 2023-07-05 DIAGNOSIS — E11.65 UNCONTROLLED TYPE 2 DIABETES MELLITUS WITH HYPERGLYCEMIA (HCC): ICD-10-CM

## 2023-07-05 RX ORDER — GLIMEPIRIDE 2 MG/1
2 TABLET ORAL EVERY MORNING
Qty: 90 TABLET | Refills: 3 | Status: SHIPPED | OUTPATIENT
Start: 2023-07-05

## 2023-07-05 NOTE — TELEPHONE ENCOUNTER
Received request via: Pharmacy    Was the patient seen in the last year in this department? Yes  LOV 02/27/2023  Does the patient have an active prescription (recently filled or refills available) for medication(s) requested? No    Does the patient have [facility] and need 100 day supply (blood pressure, diabetes and cholesterol meds only)? Patient does not have SCP

## 2023-09-20 RX ORDER — SIMVASTATIN 40 MG
TABLET ORAL
Qty: 90 TABLET | Refills: 3 | Status: SHIPPED | OUTPATIENT
Start: 2023-09-20

## 2023-09-20 NOTE — TELEPHONE ENCOUNTER
Received request via: Pharmacy    Was the patient seen in the last year in this department? Yes  2/27/23  Does the patient have an active prescription (recently filled or refills available) for medication(s) requested? No    Does the patient have skilled nursing [facility] and need 100 day supply (blood pressure, diabetes and cholesterol meds only)? Patient does not have SCP

## 2023-11-29 ENCOUNTER — PATIENT MESSAGE (OUTPATIENT)
Dept: HEALTH INFORMATION MANAGEMENT | Facility: OTHER | Age: 78
End: 2023-11-29

## 2024-05-22 NOTE — TELEPHONE ENCOUNTER
Received request via: Pharmacy    Was the patient seen in the last year in this department? No    Does the patient have an active prescription (recently filled or refills available) for medication(s) requested? No    Pharmacy Name: [facility]     Does the patient have care home [facility] and need 100 day supply (blood pressure, diabetes and cholesterol meds only)? Patient does not have SCP

## 2024-05-23 RX ORDER — MELOXICAM 15 MG/1
TABLET ORAL
Qty: 90 TABLET | Refills: 0 | Status: SHIPPED | OUTPATIENT
Start: 2024-05-23

## 2024-05-23 RX ORDER — OXYBUTYNIN CHLORIDE 10 MG/1
TABLET, EXTENDED RELEASE ORAL
Qty: 90 TABLET | Refills: 0 | Status: SHIPPED | OUTPATIENT
Start: 2024-05-23

## 2024-09-03 DIAGNOSIS — E11.65 UNCONTROLLED TYPE 2 DIABETES MELLITUS WITH HYPERGLYCEMIA (HCC): ICD-10-CM

## 2024-09-04 RX ORDER — MELOXICAM 15 MG/1
TABLET ORAL
Qty: 90 TABLET | Refills: 0 | Status: SHIPPED | OUTPATIENT
Start: 2024-09-04

## 2024-09-04 RX ORDER — GLIMEPIRIDE 2 MG/1
2 TABLET ORAL EVERY MORNING
Qty: 90 TABLET | Refills: 0 | Status: SHIPPED | OUTPATIENT
Start: 2024-09-04

## 2024-11-16 DIAGNOSIS — E11.65 UNCONTROLLED TYPE 2 DIABETES MELLITUS WITH HYPERGLYCEMIA (HCC): ICD-10-CM

## 2024-11-18 RX ORDER — GLIMEPIRIDE 2 MG/1
TABLET ORAL
Qty: 90 TABLET | Refills: 0 | Status: SHIPPED | OUTPATIENT
Start: 2024-11-18

## 2024-11-18 RX ORDER — MELOXICAM 15 MG/1
TABLET ORAL
Qty: 90 TABLET | Refills: 0 | Status: SHIPPED | OUTPATIENT
Start: 2024-11-18

## (undated) DEVICE — ELECTRODE DUAL RETURN W/ CORD - (50/PK)

## (undated) DEVICE — HEAD HOLDER JUNIOR/ADULT

## (undated) DEVICE — SET EXTENSION WITH 2 PORTS (48EA/CA) ***PART #2C8610 IS A SUBSTITUTE*****

## (undated) DEVICE — SUTURE 1 PDS-2 PLUS CTX - (24/BX)

## (undated) DEVICE — KIT ROOM DECONTAMINATION

## (undated) DEVICE — PIN TROCAR GEN 1/8X3 (4EA/BX)

## (undated) DEVICE — DERMABOND ADVANCED - (12EA/BX)

## (undated) DEVICE — SODIUM CHL IRRIGATION 0.9% 1000ML (12EA/CA)

## (undated) DEVICE — NEPTUNE 4 PORT MANIFOLD - (20/PK)

## (undated) DEVICE — GLOVE BIOGEL SZ 8 SURGICAL PF LTX - (50PR/BX 4BX/CA)

## (undated) DEVICE — ELECTRODE 850 FOAM ADHESIVE - HYDROGEL RADIOTRNSPRNT (50/PK)

## (undated) DEVICE — KIT ANESTHESIA W/CIRCUIT & 3/LT BAG W/FILTER (20EA/CA)

## (undated) DEVICE — DRESSING AQUACEL AG ADVANTAGE 3.5 X 10" (10EA/BX)"

## (undated) DEVICE — TIP INTPLS HFLO ML ORFC BTRY - (12/CS)  FOR SURGILAV

## (undated) DEVICE — HANDPIECE 10FT INTPLS SCT PLS IRRIGATION HAND CONTROL SET (6/PK)

## (undated) DEVICE — SODIUM CHL. IRRIGATION 0.9% 3000ML (4EA/CA 65CA/PF)

## (undated) DEVICE — Device

## (undated) DEVICE — BANDAGE ELASTIC 6 IN X 5 YDS - LATEX FREE (10/BX)

## (undated) DEVICE — LACTATED RINGERS INJ 1000 ML - (14EA/CA 60CA/PF)

## (undated) DEVICE — GLOVE BIOGEL PI ORTHO SZ 7.5 PF LF (40PR/BX)

## (undated) DEVICE — SUCTION INSTRUMENT YANKAUER BULBOUS TIP W/O VENT (50EA/CA)

## (undated) DEVICE — BLADE SAGITTAL 34MM

## (undated) DEVICE — MIXER BONE CEMENT REVOLUTION - W/FEMORAL PRESSURIZER (6/CA)

## (undated) DEVICE — CANISTER SUCTION 3000ML MECHANICAL FILTER AUTO SHUTOFF MEDI-VAC NONSTERILE LF DISP  (40EA/CA)

## (undated) DEVICE — SUTURE 3-0 MONOCRYL PLUS PS-1 - 27 INCH (36/BX)

## (undated) DEVICE — TUBING CLEARLINK DUO-VENT - C-FLO (48EA/CA)

## (undated) DEVICE — DRAPE LARGE 3 QUARTER - (20/CA)

## (undated) DEVICE — PACK TOTAL KNEE  (1/CA)

## (undated) DEVICE — BLADE 90X18X1.27MM SAW SAGITTAL

## (undated) DEVICE — SUTURE GENERAL

## (undated) DEVICE — BLOCK

## (undated) DEVICE — SUTURE 1 VICRYL PLUS CTX - 8 X 18 INCH (12/BX)

## (undated) DEVICE — BLADE SAGITTAL SAW DUAL CUT 25.0 X 90.0 X 1.27MM (1/EA)

## (undated) DEVICE — BLADE SURGICAL CLIPPER - (50EA/CA)

## (undated) DEVICE — SUTURE 2-0 VICRYL PLUS CT-1 - 8 X 18 INCH(12/BX)

## (undated) DEVICE — STOCKINETTE IMPERVIOUS 12X48 - STERILELF (10/CA)"

## (undated) DEVICE — CHLORAPREP 26 ML APPLICATOR - ORANGE TINT(25/CA)

## (undated) DEVICE — WATER IRRIGATION STERILE 1000ML (12EA/CA)

## (undated) DEVICE — MASK ANESTHESIA ADULT  - (100/CA)

## (undated) DEVICE — LENS/HOOD FOR SPACESUIT - (32/PK) PEEL AWAY FACE

## (undated) DEVICE — GOWN WARMING STANDARD FLEX - (30/CA)